# Patient Record
Sex: FEMALE | Race: BLACK OR AFRICAN AMERICAN | NOT HISPANIC OR LATINO | Employment: FULL TIME | URBAN - METROPOLITAN AREA
[De-identification: names, ages, dates, MRNs, and addresses within clinical notes are randomized per-mention and may not be internally consistent; named-entity substitution may affect disease eponyms.]

---

## 2022-07-08 ENCOUNTER — TELEPHONE (OUTPATIENT)
Dept: OBSTETRICS AND GYNECOLOGY | Facility: CLINIC | Age: 22
End: 2022-07-08
Payer: MEDICAID

## 2022-07-08 NOTE — TELEPHONE ENCOUNTER
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 Currently 18 wks 2 days. Has hypertension and current ob states she will need to be induced on 11/23/22 due to hypertension. Pt is wanting to delivery baby here. Pt states she will be coming home on 11/20/22 and per darren mai pt needs to come with all medical records on 11/20/22 to est care so that we she can evaluate pt and scheduled induction. Pt states that her leave has already been approved and she will be taking her full maternity leave in [place] so that pt can stay with her mother. I sent pt a portal link and told pt to give us a call or send a message through the portal around the middle of October so that we can schedule appointment on 11/20/22 for her to see Mrs. Mai. Also informed pt that she could fax records over as her pregnancy goes if she would like. Pt verbalized understanding.

## 2024-05-21 ENCOUNTER — OFFICE VISIT (OUTPATIENT)
Dept: FAMILY MEDICINE | Facility: CLINIC | Age: 24
End: 2024-05-21
Payer: COMMERCIAL

## 2024-05-21 VITALS
BODY MASS INDEX: 31.99 KG/M2 | HEART RATE: 77 BPM | RESPIRATION RATE: 18 BRPM | DIASTOLIC BLOOD PRESSURE: 70 MMHG | HEIGHT: 64 IN | WEIGHT: 187.38 LBS | TEMPERATURE: 98 F | OXYGEN SATURATION: 99 % | SYSTOLIC BLOOD PRESSURE: 104 MMHG

## 2024-05-21 DIAGNOSIS — G89.29 CHRONIC RIGHT SHOULDER PAIN: ICD-10-CM

## 2024-05-21 DIAGNOSIS — Z76.89 ENCOUNTER TO ESTABLISH CARE: Primary | ICD-10-CM

## 2024-05-21 DIAGNOSIS — I10 HYPERTENSION, UNSPECIFIED TYPE: ICD-10-CM

## 2024-05-21 DIAGNOSIS — F43.10 PTSD (POST-TRAUMATIC STRESS DISORDER): ICD-10-CM

## 2024-05-21 DIAGNOSIS — E28.2 PCOS (POLYCYSTIC OVARIAN SYNDROME): ICD-10-CM

## 2024-05-21 DIAGNOSIS — M54.50 LUMBAR BACK PAIN: ICD-10-CM

## 2024-05-21 DIAGNOSIS — I51.9 HEART DISEASE: ICD-10-CM

## 2024-05-21 DIAGNOSIS — M25.511 CHRONIC RIGHT SHOULDER PAIN: ICD-10-CM

## 2024-05-21 PROCEDURE — 99204 OFFICE O/P NEW MOD 45 MIN: CPT | Mod: ,,, | Performed by: NURSE PRACTITIONER

## 2024-05-21 PROCEDURE — 3078F DIAST BP <80 MM HG: CPT | Mod: CPTII,,, | Performed by: NURSE PRACTITIONER

## 2024-05-21 PROCEDURE — 3074F SYST BP LT 130 MM HG: CPT | Mod: CPTII,,, | Performed by: NURSE PRACTITIONER

## 2024-05-21 PROCEDURE — 1159F MED LIST DOCD IN RCRD: CPT | Mod: CPTII,,, | Performed by: NURSE PRACTITIONER

## 2024-05-21 PROCEDURE — 3008F BODY MASS INDEX DOCD: CPT | Mod: CPTII,,, | Performed by: NURSE PRACTITIONER

## 2024-05-21 RX ORDER — CELECOXIB 200 MG/1
200 CAPSULE ORAL DAILY
COMMUNITY
Start: 2024-04-02 | End: 2024-07-01

## 2024-05-21 RX ORDER — LIDOCAINE 50 MG/G
1 PATCH TOPICAL DAILY
COMMUNITY
Start: 2024-01-26

## 2024-05-21 RX ORDER — UBROGEPANT 100 MG/1
100 TABLET ORAL DAILY PRN
COMMUNITY
Start: 2024-04-02

## 2024-05-21 RX ORDER — IBUPROFEN 600 MG/1
600 TABLET ORAL DAILY PRN
COMMUNITY
Start: 2024-03-06

## 2024-05-21 NOTE — PROGRESS NOTES
SUBJECTIVE:     History of Present Illness      Chief Complaint: Establish Care (C/O right shoulder pain, chronic, x 3 years.  C/O bilateral hip pain x 3 years.  Has hypertensive heart disease, blood pressure been running high, having headaches, dizziness and black spots x 2 weeks ( mult meds for HTN).)    HPI:  Patient is a 23 y.o. year old female who presents to clinic f to establish care as a new patient.  Patient reports previous PCP [facility].  She has a history of reported hypertension and heart disease.  Patient states most of her hypertension issues began around her pregnancy approximately 2 years ago.  She is currently taking labetalol, amlodipine, losartan HCTZ and Minipress and spironolactone for uncontrolled hypertension. '  In the past she has followed up with [facility] in the [place] area.     Today's patient's blood pressure 104/70.  Patient reports that she has been having headaches feels like her blood pressure has been elevated.  Has not had any recent eye exam.      Patient also complains of chronic right shoulder and bilateral hip back pain.  Patient reports that she was in the  and has a carry a lot of heavy equipment back in 4.  Denies any injuries.    Review of Systems:    Review of Systems    12 point review of systems conducted, negative except as stated in the history of present illness. See HPI for details.     Previous History    Taj Garcia MD  Review of patient's allergies indicates:  No Known Allergies    Past Medical History:   Diagnosis Date    Genetic testing     MyRisk Negative, Remaining Lifitime Risk 11.6%    Heart disease     Hx of migraine headaches     Hypertension     Insulin resistance     PCOS (polycystic ovarian syndrome)     PTSD (post-traumatic stress disorder)      Current Outpatient Medications   Medication Instructions    amLODIPine (NORVASC) 10 MG tablet     buPROPion (WELLBUTRIN XL) 150 mg, Oral, Every morning    celecoxib (CELEBREX) 200 mg,  "Oral, Daily    ibuprofen (ADVIL,MOTRIN) 600 mg, Oral, Daily PRN    labetaloL (NORMODYNE) 200 MG tablet 1 tablet, Oral    LIDOcaine (LIDODERM) 5 % 1 patch, Transdermal, Daily    losartan-hydrochlorothiazide 50-12.5 mg (HYZAAR) 50-12.5 mg per tablet No dose, route, or frequency recorded.    meloxicam (MOBIC) 15 mg, Oral    metFORMIN (GLUCOPHAGE-XR) 500 mg, Oral, With dinner    norgestimate-ethinyl estradioL (ORTHO-CYCLEN) 0.25-35 mg-mcg per tablet 1 tablet, Oral, Daily    prazosin (MINIPRESS) 1 mg, Oral    spironolactone (ALDACTONE) 100 mg, Oral    tramadol-acetaminophen 37.5-325 mg (ULTRACET) 37.5-325 mg Tab 1 tablet, Oral, Every 6 hours PRN    UBRELVY 100 mg, Oral, Daily PRN     Past Surgical History:   Procedure Laterality Date     SECTION      WISDOM TOOTH EXTRACTION       Family History   Problem Relation Name Age of Onset    Thyroid disease Mother      Breast cancer Maternal Grandmother      Left Breast Cancer Maternal Grandmother      Diabetes Maternal Grandfather         Social History     Tobacco Use    Smoking status: Every Day     Types: Vaping with nicotine    Smokeless tobacco: Never   Substance Use Topics    Alcohol use: Yes     Comment: socially    Drug use: Yes     Types: Marijuana     Comment: prescription marijuana        Health Maintenance      Health Maintenance   Topic Date Due    Hepatitis C Screening  Never done    TETANUS VACCINE  2022    Chlamydia Screening  2025    Pap Smear  2026    Lipid Panel  Completed    HPV Vaccines  Completed       OBJECTIVE:     Physical Exam      Vital Signs Reviewed   Visit Vitals  /70 (BP Location: Left arm, Patient Position: Sitting)   Pulse 77   Temp 98.2 °F (36.8 °C) (Oral)   Resp 18   Ht 5' 4" (1.626 m)   Wt 85 kg (187 lb 6.4 oz)   LMP 2024 (Approximate)   SpO2 99%   BMI 32.17 kg/m²       Physical Exam    Physical Exam:  General: Alert, well nourished, no acute distress, non-toxic appearing.   Eyes: Anicteric sclera, " "without conjunctival injection, normal lids, no purulent drainage, EOMs grossly intact.   Ears: No tragal tenderness. Tympanic membranes intact, pearly grey, without effusion or erythema and with a positive light reflex.   Mouth: Posterior pharynx without erythema. No exudate, ulcerations, or lesion. No tonsillar swelling.   Neck: Supple, full ROM, no rigidity, no cervical adenopathy.   Cardio: Normal rate and rhythm    Resp: Respirations even and unlabored, clear to auscultation bilaterally.   Abd: No ecchymosis or distension. Normal bowel sounds in all 4 quadrants. No tenderness to palpation. No rebound tenderness or guarding. No CVA tenderness.   Skin: No rashes or open lesions noted.   MSK: No swelling. No abrasions or signs of trauma. Ambulating without assistance.   Neuro: Alert,oriented No focal deficits noted. Facial expressions even.   Psych: Cooperative, Normal affect      Procedures    Procedures     Labs     Results for orders placed or performed in visit on 04/18/24   CT/NG, T. vaginalis   Result Value Ref Range    Chlamydia, Amplified DNA Not Detected Not Detected    N gonorrhoeae, amplified DNA Not Detected Not Detected    Trichomonas vaginalis Not Detected Not Detected   Vaginosis Screen by DNA Probe   Result Value Ref Range    Trichomonas vaginalis Not Detected Not Detected    Candida sp Not Detected Not Detected    Linda glabrata DNA Not Detected Not Detected    Bacterial vaginosis DNA Not Detected Not Detected       Chemistry:  No results found for: "NA", "K", "CHLORIDE", "BUN", "CREATININE", "EGFRNORACEVR", "GLUCOSE", "CALCIUM", "ALKPHOS", "LABPROT", "ALBUMIN", "BILIDIR", "IBILI", "AST", "ALT", "MG", "PHOS", "[id number]", "TSH", "[id number]", "PSA"     No results found for: "HGBA1C", "MICROALBCREA"     Hematology:  No results found for: "WBC", "HGB", "HCT", "PLT"    Lipid Panel:  No results found for: "CHOL", "HDL", "LDL", "TRIG", "TOTALCHOLEST"     Urine:  No results found for: "COLORUA", " ""APPEARANCEUA", "SGUA", "PHUA", "PROTEINUA", "GLUCOSEUA", "KETONESUA", "BLOODUA", "NITRITESUA", "LEUKOCYTESUR", "RBCUA", "WBCUA", "BACTERIA", "SQEPUA", "HYALINECASTS", "CREATRANDUR", "PROTEINURINE", "UPROTCREA"      Assessment            ICD-10-CM ICD-9-CM   1. Encounter to establish care  Z76.89 V65.8   2. Hypertension, unspecified type  I10 401.9   3. Heart disease  I51.9 429.9   4. PTSD (post-traumatic stress disorder)  F43.10 309.81   5. PCOS (polycystic ovarian syndrome)  E28.2 256.4   6. Lumbar back pain  M54.50 724.2   7. Chronic right shoulder pain  M25.511 719.41    G89.29 338.29       Plan       1. Hypertension, unspecified type  - Ambulatory referral/consult to Cardiology; Future  Controlled with medication.   Continue current medication as prescribed   Low salt/ DASH diet   Discussed lifestyle modifications.   encourage aerobic excise at least 30 mins a day   Monitor BP daily goal less than 140/90.   Denies headaches, blurred vision, or dizziness    2. Heart disease  - Ambulatory referral/consult to Cardiology; Future    3. PTSD (post-traumatic stress disorder)  Denies SI, HI hallucinations.    Establish good family/social support, reading, meditation, physical activity.  ED precautions discussed    4. PCOS (polycystic ovarian syndrome)    5. Lumbar back pain  - X-Ray Lumbar Spine AP And Lateral; Future    6. Chronic right shoulder pain  - X-ray Shoulder 2 or More Views Right; Future    Orders Placed This Encounter    X-Ray Lumbar Spine AP And Lateral    X-ray Shoulder 2 or More Views Right    CBC Auto Differential    Comprehensive Metabolic Panel    Lipid Panel    TSH    Hemoglobin A1C    Vitamin D    Ambulatory referral/consult to Cardiology    Ambulatory referral/consult to Physical/Occupational Therapy      Medication List with Changes/Refills   Current Medications    AMLODIPINE (NORVASC) 10 MG TABLET        BUPROPION (WELLBUTRIN XL) 150 MG TB24 TABLET    Take 150 mg by mouth every morning.    " CELECOXIB (CELEBREX) 200 MG CAPSULE    Take 200 mg by mouth once daily.    IBUPROFEN (ADVIL,MOTRIN) 600 MG TABLET    Take 600 mg by mouth daily as needed.    LABETALOL (NORMODYNE) 200 MG TABLET    Take 1 tablet by mouth.    LIDOCAINE (LIDODERM) 5 %    Place 1 patch onto the skin once daily.    LOSARTAN-HYDROCHLOROTHIAZIDE 50-12.5 MG (HYZAAR) 50-12.5 MG PER TABLET        MELOXICAM (MOBIC) 15 MG TABLET    Take 15 mg by mouth.    METFORMIN (GLUCOPHAGE-XR) 500 MG ER 24HR TABLET    Take 1 tablet (500 mg total) by mouth daily with dinner or evening meal.    NORGESTIMATE-ETHINYL ESTRADIOL (ORTHO-CYCLEN) 0.25-35 MG-MCG PER TABLET    Take 1 tablet by mouth once daily.    PRAZOSIN (MINIPRESS) 1 MG CAP    Take 1 mg by mouth.    SPIRONOLACTONE (ALDACTONE) 100 MG TABLET    Take 100 mg by mouth.    TRAMADOL-ACETAMINOPHEN 37.5-325 MG (ULTRACET) 37.5-325 MG TAB    Take 1 tablet by mouth every 6 (six) hours as needed.    UBROGEPANT (UBRELVY) 100 MG TABLET    Take 100 mg by mouth daily as needed.       Follow up in about 6 weeks (around 7/2/2024) for Wellness, LABS Prior.   Follow up in about 6 weeks (around 7/2/2024) for Wellness, LABS Prior. In addition to their scheduled follow up, the patient has also been instructed to follow up on as needed basis.   Future Appointments   Date Time Provider Department Center   7/11/2024  8:30 AM Nicholas Rivas FNP [facility]   9/12/2024  1:00 PM Micheal Cartwright MD [facility] [place] [facility]

## 2024-06-04 ENCOUNTER — CLINICAL SUPPORT (OUTPATIENT)
Dept: REHABILITATION | Facility: HOSPITAL | Age: 24
End: 2024-06-04
Payer: COMMERCIAL

## 2024-06-04 DIAGNOSIS — M25.551 BILATERAL HIP PAIN: ICD-10-CM

## 2024-06-04 DIAGNOSIS — M54.50 LUMBAR BACK PAIN: Primary | ICD-10-CM

## 2024-06-04 DIAGNOSIS — M25.552 BILATERAL HIP PAIN: ICD-10-CM

## 2024-06-04 DIAGNOSIS — R29.898 DECREASED STRENGTH OF LOWER EXTREMITY: ICD-10-CM

## 2024-06-04 DIAGNOSIS — R29.898 IMPAIRED FLEXIBILITY OF LOWER EXTREMITY: ICD-10-CM

## 2024-06-04 PROCEDURE — 97162 PT EVAL MOD COMPLEX 30 MIN: CPT

## 2024-06-04 NOTE — PLAN OF CARE
"[facility]   Physical Therapy Initial Evaluation      Name: Karol [facility] Number: [id number]    Therapy Diagnosis:   Encounter Diagnoses   Name Primary?    Lumbar back pain Yes    Bilateral hip pain     Impaired flexibility of lower extremity     Decreased strength of lower extremity         Physician: Nicholas Rivas FNP    Physician Orders: PT Eval and Treat, 2 wk 6  Medical Diagnosis from Referral: M54.5- Lumbar Back Pain  Evaluation Date: 6/4/2024  Authorization Period Expiration: TBD  Plan of Care Expiration: TBD  Reassessment / Plan of Care Due: 7/2/24  Visit # / Visits authorized: 0/      Functional hip FOTO score: 40 (6/4/24)    Precautions: Standard     Time In: 0800  Time Out: 0855  Total Appointment Time (timed & untimed codes): 55 minutes    Subjective     Date of onset / History of current condition - Karol reports: she has been having B hip pain for a few years now with no specific injury however she was in the  and performed lots of hard / heavy training, lifting, carrying things on her back, running which may have caused her problems. Pain is increased with prolonged standing, lifting, lateral hip movements, trying to work-out. No pain in sitting or laying on her sides. She takes Meloxicam which provides no relief    Falls: none    Imaging: x-ray ordered of lumbar spine however not performed yet.     Prior Therapy: none  Social History:  lives with a young child  Occupation: student  Prior Level of Function: Independent  Current Level of Function: Independent    Pain:  Current 6/10, worst 9/10, best 6/10   Location: B hips, midline low back  Description: Aching, Tingling, Deep, Numb, and constant  Aggravating Factors: prolonged standing, lifting, bending, carrying things  Easing Factors: sitting, laying down    Patients goals: "decrease B hip pain"     Medical History:   Past Medical History:   Diagnosis Date    Genetic testing     MyRisk Negative, " Remaining Lifitime Risk 11.6%    Heart disease     Hx of migraine headaches     Hypertension     Insulin resistance     PCOS (polycystic ovarian syndrome)     PTSD (post-traumatic stress disorder)        Surgical History:   Karol Flanagan  has a past surgical history that includes  section and Viola tooth extraction.    Medications:   Karol has a current medication list which includes the following prescription(s): amlodipine, bupropion, celecoxib, ibuprofen, labetalol, lidocaine, losartan-hydrochlorothiazide 50-12.5 mg, meloxicam, metformin, norgestimate-ethinyl estradiol, prazosin, spironolactone, tramadol-acetaminophen 37.5-325 mg, and ubrelvy, and the following Facility-Administered Medications: etonogestrel.    Allergies:   Review of patient's allergies indicates:  No Known Allergies     Objective      B hip strength: 4/5 MMT throughout with c/o increased groin pain with resisted hip flexion  Lumbar ROM: WNL throughout without c/o pain  Muscle tightness: B hamstrings, quadriceps, piriformis R > L  Tenderness: no tenderness with PA glides throughout lumbar spine or B SIJ    (+) KATHY and FADIR in R hip for pain but no crepitus    No relief of symptoms with R LE distraction in flexion, abduction, and ER position    Treatment     Total Treatment time (time-based codes) separate from Evaluation: 5 minutes     Karol received the treatments listed below:      therapeutic exercises to develop strength, flexibility, and core stabilization for 5 minutes including:    Therapeutic Exercise   Therapeutic Exercise Grid     Exercise 1  Exercise 2  Exercise 3  Exercise 4    Exercise :    B LE Stretches:   Hamstrings,  Piriformis,  Quadriceps,  IT band B LE: 4 way hip Bridges with abd resistance with TB Prone reverse clams   Repetition/Time :                  Resist or Assist :                  Comment :                Done :                                Exercise 5  Exercise 6  Exercise 7  Exercise 8    Exercise :     Sit to stands with weight   Dead lifts Abdominal press with SB with head lift   Leg press   Repetition/Time :                  Resist or Assist :                  Comment :                  Done :                                  Exercise 9  Exercise 10  Exercise 11  Exercise 12    Exercise :    squats   Waddle with TB         Repetition/Time :                  Resist or Assist :                  Comment :                  Done :                                 Exercise 13 Exercise 14  Exercise 15  Exercise 16   Exercise :                  Repetition/Time :                  Resist or Assist :                  Comment :                  Done :                                  Patient Education and Home Exercises     Education provided:   - importance and benefits of performing HEP daily for optimal improvements  -importance of strengthening core muscles and decreased muscle tightness to reduce stress on lumbar spine with daily activities    Written Home Exercises Provided: Patient instructed to cont prior HEP. Exercises were reviewed and Karol was able to demonstrate them prior to the end of the session.  Karol demonstrated good understanding of the education provided. See EMR under Patient Instructions for exercises provided during therapy sessions.    Assessment     Karol is a 23 y.o. female referred to outpatient Physical Therapy with a medical diagnosis of lumbar back pain. Patient presents with pain in B hips, muscle tightness, and core and hip muscle weakness affecting tolerance with daily activities. Pt would benefit from PT services to address pt's deficits    Patient prognosis is Good.   Patient will benefit from skilled outpatient Physical Therapy to address the deficits stated above and in the chart below, provide patient /family education, and to maximize patientt's level of independence.     Plan of care discussed with patient: YES  Patient's spiritual, cultural and educational needs considered and  patient is agreeable to the plan of care and goals as stated below:     Anticipated Barriers for therapy: chronic LBP and B hip pain    Medical Necessity is demonstrated by the following  History  Co-morbidities and personal factors that may impact the plan of care [] LOW: no personal factors / co-morbidities  [x] MODERATE: 1-2 personal factors / co-morbidities  [] HIGH: 3+ personal factors / co-morbidities    Moderate / High Support Documentation:     Co-morbidities affecting plan of care: see above     Examination  Body Structures and Functions, activity limitations and participation restrictions that may impact the plan of care [] LOW: addressing 1-2 elements  [x] MODERATE: 3+ elements  [] HIGH: 4+ elements (please support below)    Moderate / High Support Documentation: see above     Clinical Presentation [] LOW: stable  [x] MODERATE: Evolving  [] HIGH: Unstable     Decision Making/ Complexity Score: moderate       Goals:    Short Term Goals: 4 weeks     Pt will demonstrate knowledge and independence with HEP to continue with exercises outside of therapy to facilitate optimal overall improvements    Pt will improve functional score on hip FOTO by >10 points which indicates improved ability to perform daily tasks with less difficulty and pain    Reduce c/o pain in B hips to <4/10 to improve tolerance with daily activities    Improve strength in B hips to 4+/5 MMT throughout in order to improve tolerance with overall functional mobility    Long Term Goals: 6 weeks     Pt will improve functional score on hip FOTO by >20 points which indicates improved ability to perform daily tasks with less difficulty and pain    Reduce c/o pain in B hips to <2/10 to improve tolerance with daily activities    Improve strength in B hips to 5/5 MMT throughout in order to improve tolerance with overall functional mobility    Improve flexibility of B LE in order to reduce mechanical stressors on the lumbar spine    Pt will demonstrate  proper bending and lifting techniques to reduce stress on lumbar spine    Improve core strength as evidence by able to carry / lift heavy objects with <2/10 pain level    Plan     Plan of care Certification: TBD.    Outpatient Physical Therapy 2 times weekly for 6 weeks to include the following interventions: Manual Therapy, Patient Education, Therapeutic Exercise, and pain management .     Nadia Coyle, PT       I CERTIFY THE NEED FOR THESE SERVICES FURNISHED UNDER THIS PLAN OF TREATMENT AND WHILE UNDER MY CARE   Physician's comments:      Physician's Signature: ___________________________________________________

## 2024-06-11 DIAGNOSIS — M54.50 LUMBAR BACK PAIN: Primary | ICD-10-CM

## 2024-06-19 ENCOUNTER — CLINICAL SUPPORT (OUTPATIENT)
Dept: REHABILITATION | Facility: HOSPITAL | Age: 24
End: 2024-06-19
Payer: COMMERCIAL

## 2024-06-19 DIAGNOSIS — M25.551 BILATERAL HIP PAIN: ICD-10-CM

## 2024-06-19 DIAGNOSIS — R29.898 IMPAIRED FLEXIBILITY OF LOWER EXTREMITY: ICD-10-CM

## 2024-06-19 DIAGNOSIS — R29.898 DECREASED STRENGTH OF LOWER EXTREMITY: ICD-10-CM

## 2024-06-19 DIAGNOSIS — M54.50 LUMBAR BACK PAIN: Primary | ICD-10-CM

## 2024-06-19 DIAGNOSIS — M25.552 BILATERAL HIP PAIN: ICD-10-CM

## 2024-06-19 PROCEDURE — 97110 THERAPEUTIC EXERCISES: CPT

## 2024-06-19 NOTE — PROGRESS NOTES
[facility]   Physical Therapy Treatment Note      Name: Karol Wilbur  Clinic Number: [id number]    Therapy Diagnosis:   Encounter Diagnoses   Name Primary?    Lumbar back pain Yes    Bilateral hip pain     Impaired flexibility of lower extremity     Decreased strength of lower extremity      Physician: Nicholas Rivas FNP    Visit Date: 6/19/2024    Medical Diagnosis from Referral: M54.5- Lumbar Back Pain  Evaluation Date: 6/4/2024  Authorization Period Expiration: 11/30/2024  Plan of Care Expiration: 11/30  Reassessment / Plan of Care Due: 7/2/24  Visit # / Visits authorized: 1/ 12      PTA Visit #: 0/5     Time In: 1025  Time Out: 1100  Total Billable Time: 35 minutes    Subjective     Pt reports: she had left hip pain with end range flexion. She reports performed HEP issued on eval  She was compliant with home exercise program.  Response to previous treatment: n/a  Functional change: n/a    Functional hip FOTO score: 40 (6/4/24)    Pain: 4/10 with Meloxicam  Location: B hips     Objective      N/a.     Treatment     Karol received the treatments listed below:      therapeutic exercises to develop strength, flexibility, and core stabilization for 35 minutes including:     Therapeutic Exercise   Therapeutic Exercise Grid     Exercise 1  Exercise 2  Exercise 3  Exercise 4    Exercise :    B LE Stretches:   Hamstrings,  Piriformis,  Quadriceps,  IT band B LE: 4 way hip Bridges with abd resistance with TB Prone reverse clams   Repetition/Time :    3 x 30 sec   15   10 x 5 sec   10     Resist or Assist :          Green TB   Green TB     Comment :           pain across low back      Done :    yes  yes yes   yes                      Exercise 5  Exercise 6  Exercise 7  Exercise 8    Exercise :    Sit to stands with weight   Dead lifts Abdominal press with SB with head lift   Leg press   Repetition/Time :    10   10   10 x 5 sec        Resist or Assist :    5 lbs   10 lbs           Comment :                   Done :    yes   yes yes                         Exercise 9  Exercise 10  Exercise 11  Exercise 12    Exercise :    squats   Waddle with TB         Repetition/Time :                  Resist or Assist :                  Comment :                  Done :                                   Exercise 13 Exercise 14  Exercise 15  Exercise 16   Exercise :                  Repetition/Time :                  Resist or Assist :                  Comment :                  Done :                                   Patient Education and Home Exercises       Education provided:   - importance and benefits of performing HEP daily for optimal improvements  - importance of strengthening core muscles and decreased muscle tightness to reduce stress on lumbar spine with daily activities    Written Home Exercises Provided: Patient instructed to cont prior HEP. Exercises were reviewed and Karol was able to demonstrate them prior to the end of the session.  Karol demonstrated good understanding of the education provided. See EMR under Patient Instructions for exercises provided during therapy sessions    Assessment     Pt tolerated initiation of stretches and core and LE strengthening exercises well with cues required for proper technique to isolate specific muscles. Pt had pain across her lumbar spine with bridges exercise. Discussed expected soreness when starting exercise program    Karol Is progressing well towards her goals.   Pt prognosis is Good.     Pt will continue to benefit from skilled outpatient physical therapy to address the deficits listed in the problem list box on initial evaluation, provide pt/family education and to maximize pt's level of independence in the home and community environment.     Pt's spiritual, cultural and educational needs considered and pt agreeable to plan of care and goals.     Anticipated barriers to physical therapy: chronic LBP and B hip pain     Goals:   Short Term Goals: 4 weeks       Pt will demonstrate knowledge and independence with HEP to continue with exercises outside of therapy to facilitate optimal overall improvements     Pt will improve functional score on hip FOTO by >10 points which indicates improved ability to perform daily tasks with less difficulty and pain     Reduce c/o pain in B hips to <4/10 to improve tolerance with daily activities     Improve strength in B hips to 4+/5 MMT throughout in order to improve tolerance with overall functional mobility     Long Term Goals: 6 weeks      Pt will improve functional score on hip FOTO by >20 points which indicates improved ability to perform daily tasks with less difficulty and pain     Reduce c/o pain in B hips to <2/10 to improve tolerance with daily activities     Improve strength in B hips to 5/5 MMT throughout in order to improve tolerance with overall functional mobility     Improve flexibility of B LE in order to reduce mechanical stressors on the lumbar spine     Pt will demonstrate proper bending and lifting techniques to reduce stress on lumbar spine     Improve core strength as evidence by able to carry / lift heavy objects with <2/10 pain level    Plan     Continue with current POC and progress per pt's tolerance    Nadia Coyle, PT

## 2024-06-24 ENCOUNTER — CLINICAL SUPPORT (OUTPATIENT)
Dept: REHABILITATION | Facility: HOSPITAL | Age: 24
End: 2024-06-24
Payer: COMMERCIAL

## 2024-06-24 DIAGNOSIS — M25.552 BILATERAL HIP PAIN: ICD-10-CM

## 2024-06-24 DIAGNOSIS — M54.50 LUMBAR BACK PAIN: Primary | ICD-10-CM

## 2024-06-24 DIAGNOSIS — M25.551 BILATERAL HIP PAIN: ICD-10-CM

## 2024-06-24 DIAGNOSIS — R29.898 IMPAIRED FLEXIBILITY OF LOWER EXTREMITY: ICD-10-CM

## 2024-06-24 DIAGNOSIS — R29.898 DECREASED STRENGTH OF LOWER EXTREMITY: ICD-10-CM

## 2024-06-24 NOTE — PROGRESS NOTES
[facility]   Physical Therapy Treatment Note      Name: Karol Malinais  Clinic Number: [id number]    Therapy Diagnosis:   Encounter Diagnoses   Name Primary?    Lumbar back pain Yes    Bilateral hip pain     Impaired flexibility of lower extremity     Decreased strength of lower extremity      Physician: Nicholas Rivas FNP    Visit Date: 6/24/2024    Medical Diagnosis from Referral: M54.5- Lumbar Back Pain  Evaluation Date: 6/4/2024  Authorization Period Expiration: 11/30/2024  Plan of Care Expiration: 11/30/2024  Reassessment / Plan of Care Due: 7/2/24  Visit # / Visits authorized: 2/ 12      PTA Visit #: 0/5     Time In: 1100  Time Out: 1145  Total Billable Time: 45 minutes    Subjective     Pt reports: her B hip pain is the same even with Meloxicam medication. Increased soreness after cutting her grass yesterday. Reports numbness in B LE during hamstring stretch  She was compliant with home exercise program.  Response to previous treatment: fair  Functional change: none thus far    Functional hip FOTO score: 40 (6/4/24)    Pain: 4/10 with Meloxicam  Location: B hips     Objective      N/a.     Treatment     Karol received the treatments listed below:      therapeutic exercises to develop strength, flexibility, and core stabilization for 45 minutes including:     Therapeutic Exercise   Therapeutic Exercise Grid     Exercise 1  Exercise 2  Exercise 3  Exercise 4    Exercise :    B LE Stretches:   Hamstrings,  Piriformis,  Quadriceps,  IT band B LE: 4 way hip Bridges with abd resistance with TB Prone reverse clams   Repetition/Time :    3 x 30 sec   15   10 x 5 sec   10     Resist or Assist :          Green TB   Green TB     Comment :           pain across low back      Done :    yes  yes yes   yes                      Exercise 5  Exercise 6  Exercise 7  Exercise 8    Exercise :    Sit to stands with weight   Dead lifts Abdominal press with SB with head lift   Leg press    Repetition/Time :    10   10   10 x 5 sec        Resist or Assist :    5 lbs   10 lbs           Comment :                  Done :    yes   yes yes                         Exercise 9  Exercise 10  Exercise 11  Exercise 12    Exercise :    squats   Waddle with TB B LE distraction   Manual lumbar traction     Repetition/Time :    10   5 x 15 sec   5 x 10 sec   5 x 10 secs     Resist or Assist :       Green TB   Flexion, abduction, ER   Yellow SB     Comment :          No relief   Minimal relief   Done :    yes   yes   yes   yes                    Exercise 13 Exercise 14  Exercise 15  Exercise 16   Exercise :                  Repetition/Time :                  Resist or Assist :                  Comment :                  Done :                                 Patient Education and Home Exercises       Education provided:   - importance and benefits of performing HEP daily for optimal improvements  - importance of strengthening core muscles and decreased muscle tightness to reduce stress on lumbar spine with daily activities    Written Home Exercises Provided: Patient instructed to cont prior HEP. Exercises were reviewed and Karol was able to demonstrate them prior to the end of the session.  Karol demonstrated good understanding of the education provided. See EMR under Patient Instructions for exercises provided during therapy sessions    Assessment     Pt continues to have same amount of pain in B hips with intermittent numbness in B LE, R > L, with certain movements. No relief of symptoms with manual B LE distraction and minimal relief with manual lumbar traction. Pt performed all core and LE exercises and stretches with good effort with reps and resistance not increased per pt's request with cues required for proper technique to isolate specific muscles    Karol Is progressing well towards her goals.   Pt prognosis is Good.     Pt will continue to benefit from skilled outpatient physical therapy to address the  deficits listed in the problem list box on initial evaluation, provide pt/family education and to maximize pt's level of independence in the home and community environment.     Pt's spiritual, cultural and educational needs considered and pt agreeable to plan of care and goals.     Anticipated barriers to physical therapy: chronic LBP and B hip pain     Goals:     Short Term Goals: 4 weeks      Pt will demonstrate knowledge and independence with HEP to continue with exercises outside of therapy to facilitate optimal overall improvements     Pt will improve functional score on hip FOTO by >10 points which indicates improved ability to perform daily tasks with less difficulty and pain     Reduce c/o pain in B hips to <4/10 to improve tolerance with daily activities     Improve strength in B hips to 4+/5 MMT throughout in order to improve tolerance with overall functional mobility     Long Term Goals: 6 weeks      Pt will improve functional score on hip FOTO by >20 points which indicates improved ability to perform daily tasks with less difficulty and pain     Reduce c/o pain in B hips to <2/10 to improve tolerance with daily activities     Improve strength in B hips to 5/5 MMT throughout in order to improve tolerance with overall functional mobility     Improve flexibility of B LE in order to reduce mechanical stressors on the lumbar spine     Pt will demonstrate proper bending and lifting techniques to reduce stress on lumbar spine     Improve core strength as evidence by able to carry / lift heavy objects with <2/10 pain level    Plan     Continue with current POC and progress per pt's tolerance    Nadia Coyle PT

## 2024-06-25 ENCOUNTER — CLINICAL SUPPORT (OUTPATIENT)
Dept: REHABILITATION | Facility: HOSPITAL | Age: 24
End: 2024-06-25
Payer: COMMERCIAL

## 2024-06-25 DIAGNOSIS — M25.552 BILATERAL HIP PAIN: ICD-10-CM

## 2024-06-25 DIAGNOSIS — M25.551 BILATERAL HIP PAIN: ICD-10-CM

## 2024-06-25 DIAGNOSIS — M54.50 LUMBAR BACK PAIN: Primary | ICD-10-CM

## 2024-06-25 DIAGNOSIS — R29.898 DECREASED STRENGTH OF LOWER EXTREMITY: ICD-10-CM

## 2024-06-25 DIAGNOSIS — R29.898 IMPAIRED FLEXIBILITY OF LOWER EXTREMITY: ICD-10-CM

## 2024-06-25 PROCEDURE — 97110 THERAPEUTIC EXERCISES: CPT | Mod: CQ

## 2024-06-25 NOTE — PROGRESS NOTES
[facility]   Physical Therapy Treatment Note      Name: Karol Harwood  Clinic Number: [id number]    Therapy Diagnosis:        Encounter Diagnoses   Name Primary?    Lumbar back pain Yes    Bilateral hip pain      Impaired flexibility of lower extremity      Decreased strength of lower extremity        Physician: Nicholas Rivas FNP    Visit Date: 6/25/2024    Medical Diagnosis from Referral: M54.5- Lumbar Back Pain  Evaluation Date: 6/4/2024  Authorization Period Expiration: 11/30/2024  Plan of Care Expiration: 11/30/2024  Reassessment / Plan of Care Due: 7/2/24  Visit # / Visits authorized: 3/ 12      PTA Visit #: 1/5     Time In: 1107  Time Out: 1145  Total Billable Time: 38 minutes    Subjective     Pt reports: her pain starts in the lower back and goes around to both hips.     She was compliant with home exercise program.  Response to previous treatment: same  Functional change: none thus far    Functional hip FOTO score: 40 (6/4/24)    Pain: 4/10 with Meloxicam  Location: B hips     Objective      N/a.     Treatment     Karol received the treatments listed below:      therapeutic exercises to develop strength, flexibility, and core stabilization for 38 minutes including:     Therapeutic Exercise   Therapeutic Exercise Grid     Exercise 1  Exercise 2  Exercise 3  Exercise 4    Exercise :    B LE Stretches:   Hamstrings,  Piriformis,  Quadriceps,  IT band B LE: 4 way hip Bridges with abd resistance with TB Prone reverse clams   Repetition/Time :    3 x 30 sec   2x10   10 x 5 sec   15     Resist or Assist :          Green TB   Green TB     Comment :                Done :    yes  yes yes   yes                      Exercise 5  Exercise 6  Exercise 7  Exercise 8    Exercise :    Sit to stands with weight   Dead lifts Abdominal press with SB with head lift   Leg press   Repetition/Time :    10   10   10 x 5 sec        Resist or Assist :    5 lbs   10 lbs           Comment :                   Done :    yes                           Exercise 9  Exercise 10  Exercise 11  Exercise 12    Exercise :    squats   Waddle with TB B LE distraction   Manual lumbar traction     Repetition/Time :    10   5 x 15 sec   5 x 10 sec   10 x 10 secs     Resist or Assist :       Green TB   Flexion, abduction, ER   white SB     Comment :          No relief   Minimal relief   Done :     yes    yes                    Exercise 13 Exercise 14  Exercise 15  Exercise 16   Exercise :                  Repetition/Time :                  Resist or Assist :                  Comment :                  Done :                                 Patient Education and Home Exercises       Education provided:   - importance and benefits of performing HEP daily for optimal improvements  - importance of strengthening core muscles and decreased muscle tightness to reduce stress on lumbar spine with daily activities    Written Home Exercises Provided: Patient instructed to cont prior HEP. Exercises were reviewed and Karol was able to demonstrate them prior to the end of the session.  Karol demonstrated good understanding of the education provided. See EMR under Patient Instructions for exercises provided during therapy sessions    Assessment     Karol demonstrated fair response after today's session as evidenced by report of mild pain in lower back and hips during mobility and thoracolumbar ROM. She is making an expected progression toward goals as evidenced by ability to perform exercises with increased repetitions. However she continues to demonstrate deficits with pain during activities, bilateral hip and lumbar weakness, core stability and strength. Recommend HEP/POC to address deficits mentioned above.      Karol Is progressing well towards her goals.   Pt prognosis is Good.     Pt will continue to benefit from skilled outpatient physical therapy to address the deficits listed in the problem list box on initial evaluation, provide  pt/family education and to maximize pt's level of independence in the home and community environment.     Pt's spiritual, cultural and educational needs considered and pt agreeable to plan of care and goals.     Anticipated barriers to physical therapy: chronic LBP and B hip pain     Goals:     Short Term Goals: 4 weeks      Pt will demonstrate knowledge and independence with HEP to continue with exercises outside of therapy to facilitate optimal overall improvements     Pt will improve functional score on hip FOTO by >10 points which indicates improved ability to perform daily tasks with less difficulty and pain     Reduce c/o pain in B hips to <4/10 to improve tolerance with daily activities     Improve strength in B hips to 4+/5 MMT throughout in order to improve tolerance with overall functional mobility     Long Term Goals: 6 weeks      Pt will improve functional score on hip FOTO by >20 points which indicates improved ability to perform daily tasks with less difficulty and pain     Reduce c/o pain in B hips to <2/10 to improve tolerance with daily activities     Improve strength in B hips to 5/5 MMT throughout in order to improve tolerance with overall functional mobility     Improve flexibility of B LE in order to reduce mechanical stressors on the lumbar spine     Pt will demonstrate proper bending and lifting techniques to reduce stress on lumbar spine     Improve core strength as evidence by able to carry / lift heavy objects with <2/10 pain level    Plan     Continue with current POC and progress per pt's tolerance    Rosemary Guido PTA

## 2024-07-01 ENCOUNTER — DOCUMENTATION ONLY (OUTPATIENT)
Dept: REHABILITATION | Facility: HOSPITAL | Age: 24
End: 2024-07-01

## 2024-07-01 ENCOUNTER — CLINICAL SUPPORT (OUTPATIENT)
Dept: REHABILITATION | Facility: HOSPITAL | Age: 24
End: 2024-07-01
Payer: COMMERCIAL

## 2024-07-01 DIAGNOSIS — R29.898 DECREASED STRENGTH OF LOWER EXTREMITY: ICD-10-CM

## 2024-07-01 DIAGNOSIS — M25.552 BILATERAL HIP PAIN: ICD-10-CM

## 2024-07-01 DIAGNOSIS — R29.898 IMPAIRED FLEXIBILITY OF LOWER EXTREMITY: ICD-10-CM

## 2024-07-01 DIAGNOSIS — M25.551 BILATERAL HIP PAIN: ICD-10-CM

## 2024-07-01 DIAGNOSIS — M54.50 LUMBAR BACK PAIN: Primary | ICD-10-CM

## 2024-07-01 PROCEDURE — 97110 THERAPEUTIC EXERCISES: CPT | Mod: CQ

## 2024-07-01 NOTE — PROGRESS NOTES
[facility] OUTPATIENT THERAPY AND WELLNESS   Physical Therapy Treatment Note      Name: Karol Stowe  Clinic Number: [id number]    Therapy Diagnosis:        Encounter Diagnoses   Name Primary?    Lumbar back pain Yes    Bilateral hip pain      Impaired flexibility of lower extremity      Decreased strength of lower extremity        Physician: Nicholas Rivas FNP    Visit Date: 7/1/2024    Medical Diagnosis from Referral: M54.5- Lumbar Back Pain  Evaluation Date: 6/4/2024  Authorization Period Expiration: 11/30/2024  Plan of Care Expiration: 11/30/2024  Reassessment / Plan of Care Due: 7/2/24  Visit # / Visits authorized: 4/ 12      PTA Visit #: 2/5     Time In: 0848  Time Out: 0935  Total Billable Time: 47 minutes    Subjective     Pt reports:  her pain has gotten worse. Today her hips and lower back is hurting more despite her doing the exercises and stretches at home.     She was compliant with home exercise program.  Response to previous treatment: same  Functional change: none thus far    Functional hip FOTO score: 58 (7/1/24) improved 40 (6/4/24)    Pain: 6/10 with Meloxicam  Location: B hips     Objective      N/a.     Treatment     Kaorl received the treatments listed below:      therapeutic exercises to develop strength, flexibility, and core stabilization for 47 minutes including:     Therapeutic Exercise   Therapeutic Exercise Grid     Exercise 1  Exercise 2  Exercise 3  Exercise 4    Exercise :    B LE Stretches:   Hamstrings,  Piriformis,  Quadriceps,  IT band B LE: 4 way hip Bridges with abd resistance with TB Prone reverse clams   Repetition/Time :    3 x 30 sec   2x10   15 x 5 sec   2x10     Resist or Assist :          Green TB   Green TB     Comment :                Done :    yes  yes yes   yes                      Exercise 5  Exercise 6  Exercise 7  Exercise 8    Exercise :    Sit to stands with weight   Dead lifts Abdominal press with SB with head lift   Leg press   Repetition/Time :    15   10   10  x 5 sec        Resist or Assist :    5 lbs   10 lbs           Comment :                  Done :    yes    yes                       Exercise 9  Exercise 10  Exercise 11  Exercise 12    Exercise :    squats   Waddle with TB B LE distraction   Manual lumbar traction     Repetition/Time :    10   5 x 15 sec   5 x 10 sec   2 minutes   Resist or Assist :       Green TB   Flexion, abduction, ER   white SB     Comment :          Attempted; no relief Minimal relief   Done :      yes yes                    Exercise 13 Exercise 14  Exercise 15  Exercise 16   Exercise :                  Repetition/Time :                  Resist or Assist :                  Comment :                  Done :                                 Patient Education and Home Exercises       Education provided:   - importance and benefits of performing HEP daily for optimal improvements  - importance of strengthening core muscles and decreased muscle tightness to reduce stress on lumbar spine with daily activities    Written Home Exercises Provided: Patient instructed to cont prior HEP. Exercises were reviewed and Karol was able to demonstrate them prior to the end of the session.  Karol demonstrated good understanding of the education provided. See EMR under Patient Instructions for exercises provided during therapy sessions    Assessment     Karol demonstrated (-) response after today's session as evidenced by no change in pain after session today and also reporting she did not feel any relief with BLE distraction and lumbar traction.   She is making a fair progression toward goals as evidenced by increased functional hip FOTO score (see above) and ability to increase in repetitions despite report of pain. However she continues to demonstrate deficits with pain during activities, bilateral hip and lumbar weakness, core stability and strength. Recommend HEP/POC to address deficits mentioned above. Will look to include mechanical traction for the next  terri Roberson Is progressing well towards her goals.   Pt prognosis is Good.     Pt will continue to benefit from skilled outpatient physical therapy to address the deficits listed in the problem list box on initial evaluation, provide pt/family education and to maximize pt's level of independence in the home and community environment.     Pt's spiritual, cultural and educational needs considered and pt agreeable to plan of care and goals.     Anticipated barriers to physical therapy: chronic LBP and B hip pain     Goals:     Short Term Goals: 4 weeks      Pt will demonstrate knowledge and independence with HEP to continue with exercises outside of therapy to facilitate optimal overall improvements     Pt will improve functional score on hip FOTO by >10 points which indicates improved ability to perform daily tasks with less difficulty and pain     Reduce c/o pain in B hips to <4/10 to improve tolerance with daily activities     Improve strength in B hips to 4+/5 MMT throughout in order to improve tolerance with overall functional mobility     Long Term Goals: 6 weeks      Pt will improve functional score on hip FOTO by >20 points which indicates improved ability to perform daily tasks with less difficulty and pain     Reduce c/o pain in B hips to <2/10 to improve tolerance with daily activities     Improve strength in B hips to 5/5 MMT throughout in order to improve tolerance with overall functional mobility     Improve flexibility of B LE in order to reduce mechanical stressors on the lumbar spine     Pt will demonstrate proper bending and lifting techniques to reduce stress on lumbar spine     Improve core strength as evidence by able to carry / lift heavy objects with <2/10 pain level    Plan     Continue with current POC and progress per pt's tolerance    Rosemary Guido PTA

## 2024-07-08 ENCOUNTER — LAB VISIT (OUTPATIENT)
Dept: LAB | Facility: HOSPITAL | Age: 24
End: 2024-07-08
Attending: NURSE PRACTITIONER
Payer: COMMERCIAL

## 2024-07-08 ENCOUNTER — TELEPHONE (OUTPATIENT)
Dept: FAMILY MEDICINE | Facility: CLINIC | Age: 24
End: 2024-07-08
Payer: COMMERCIAL

## 2024-07-08 ENCOUNTER — CLINICAL SUPPORT (OUTPATIENT)
Dept: REHABILITATION | Facility: HOSPITAL | Age: 24
End: 2024-07-08
Payer: COMMERCIAL

## 2024-07-08 DIAGNOSIS — R29.898 DECREASED STRENGTH OF LOWER EXTREMITY: ICD-10-CM

## 2024-07-08 DIAGNOSIS — R29.898 IMPAIRED FLEXIBILITY OF LOWER EXTREMITY: ICD-10-CM

## 2024-07-08 DIAGNOSIS — M54.50 LUMBAR BACK PAIN: Primary | ICD-10-CM

## 2024-07-08 DIAGNOSIS — M25.551 BILATERAL HIP PAIN: ICD-10-CM

## 2024-07-08 DIAGNOSIS — Z11.8 ENCOUNTER FOR SCREENING EXAMINATION FOR CHLAMYDIAL INFECTION: Primary | ICD-10-CM

## 2024-07-08 DIAGNOSIS — Z76.89 ENCOUNTER TO ESTABLISH CARE: ICD-10-CM

## 2024-07-08 DIAGNOSIS — M25.552 BILATERAL HIP PAIN: ICD-10-CM

## 2024-07-08 LAB
25(OH)D3+25(OH)D2 SERPL-MCNC: 19 NG/ML (ref 30–80)
ALBUMIN SERPL-MCNC: 3.8 G/DL (ref 3.5–5)
ALBUMIN/GLOB SERPL: 0.9 RATIO (ref 1.1–2)
ALP SERPL-CCNC: 88 UNIT/L (ref 40–150)
ALT SERPL-CCNC: 16 UNIT/L (ref 0–55)
ANION GAP SERPL CALC-SCNC: 6 MEQ/L
AST SERPL-CCNC: 17 UNIT/L (ref 5–34)
BASOPHILS # BLD AUTO: 0.02 X10(3)/MCL
BASOPHILS NFR BLD AUTO: 0.2 %
BILIRUB SERPL-MCNC: 0.4 MG/DL
BUN SERPL-MCNC: 11.2 MG/DL (ref 7–18.7)
CALCIUM SERPL-MCNC: 9.8 MG/DL (ref 8.4–10.2)
CHLORIDE SERPL-SCNC: 106 MMOL/L (ref 98–107)
CHOLEST SERPL-MCNC: 254 MG/DL
CHOLEST/HDLC SERPL: 5 {RATIO} (ref 0–5)
CO2 SERPL-SCNC: 26 MMOL/L (ref 22–29)
CREAT SERPL-MCNC: 0.78 MG/DL (ref 0.55–1.02)
CREAT/UREA NIT SERPL: 14
EOSINOPHIL # BLD AUTO: 0.12 X10(3)/MCL (ref 0–0.9)
EOSINOPHIL NFR BLD AUTO: 1.4 %
ERYTHROCYTE [DISTWIDTH] IN BLOOD BY AUTOMATED COUNT: 13.2 % (ref 11.5–17)
EST. AVERAGE GLUCOSE BLD GHB EST-MCNC: 102.5 MG/DL
GFR SERPLBLD CREATININE-BSD FMLA CKD-EPI: >60 ML/MIN/1.73/M2
GLOBULIN SER-MCNC: 4.3 GM/DL (ref 2.4–3.5)
GLUCOSE SERPL-MCNC: 102 MG/DL (ref 74–100)
HBA1C MFR BLD: 5.2 %
HCT VFR BLD AUTO: 40.7 % (ref 37–47)
HDLC SERPL-MCNC: 50 MG/DL (ref 35–60)
HGB BLD-MCNC: 13.3 G/DL (ref 12–16)
IMM GRANULOCYTES # BLD AUTO: 0.04 X10(3)/MCL (ref 0–0.04)
IMM GRANULOCYTES NFR BLD AUTO: 0.5 %
LDLC SERPL CALC-MCNC: 178 MG/DL (ref 50–140)
LYMPHOCYTES # BLD AUTO: 2.69 X10(3)/MCL (ref 0.6–4.6)
LYMPHOCYTES NFR BLD AUTO: 31.2 %
MCH RBC QN AUTO: 27.5 PG (ref 27–31)
MCHC RBC AUTO-ENTMCNC: 32.7 G/DL (ref 33–36)
MCV RBC AUTO: 84.1 FL (ref 80–94)
MONOCYTES # BLD AUTO: 0.5 X10(3)/MCL (ref 0.1–1.3)
MONOCYTES NFR BLD AUTO: 5.8 %
NEUTROPHILS # BLD AUTO: 5.24 X10(3)/MCL (ref 2.1–9.2)
NEUTROPHILS NFR BLD AUTO: 60.9 %
PLATELET # BLD AUTO: 232 X10(3)/MCL (ref 130–400)
PMV BLD AUTO: 10.9 FL (ref 7.4–10.4)
POTASSIUM SERPL-SCNC: 4.2 MMOL/L (ref 3.5–5.1)
PROT SERPL-MCNC: 8.1 GM/DL (ref 6.4–8.3)
RBC # BLD AUTO: 4.84 X10(6)/MCL (ref 4.2–5.4)
SODIUM SERPL-SCNC: 138 MMOL/L (ref 136–145)
TRIGL SERPL-MCNC: 132 MG/DL (ref 37–140)
TSH SERPL-ACNC: 1.24 UIU/ML (ref 0.35–4.94)
VLDLC SERPL CALC-MCNC: 26 MG/DL
WBC # BLD AUTO: 8.61 X10(3)/MCL (ref 4.5–11.5)

## 2024-07-08 PROCEDURE — 84443 ASSAY THYROID STIM HORMONE: CPT

## 2024-07-08 PROCEDURE — 82306 VITAMIN D 25 HYDROXY: CPT

## 2024-07-08 PROCEDURE — 83036 HEMOGLOBIN GLYCOSYLATED A1C: CPT

## 2024-07-08 PROCEDURE — 97110 THERAPEUTIC EXERCISES: CPT

## 2024-07-08 PROCEDURE — 80061 LIPID PANEL: CPT

## 2024-07-08 PROCEDURE — 80053 COMPREHEN METABOLIC PANEL: CPT

## 2024-07-08 PROCEDURE — 85025 COMPLETE CBC W/AUTO DIFF WBC: CPT

## 2024-07-08 PROCEDURE — 36415 COLL VENOUS BLD VENIPUNCTURE: CPT

## 2024-07-08 NOTE — PROGRESS NOTES
[facility] THERAP[facility] AND [facility]LL[facility]   Reassessment / Physical Therapy Treatment Note      Name: Karol Malinais  Clinic Number: [id number]    Therapy Diagnosis:        Encounter Diagnoses   Name Primary?    Lumbar back pain Yes    Bilateral hip pain      Impaired flexibility of lower extremity      Decreased strength of lower extremity      Physician: Nicholas Rivas FNP    Visit Date: 7/8/2024    Medical Diagnosis from Referral: M54.5- Lumbar Back Pain  Evaluation Date: 6/4/2024  Authorization Period Expiration: 11/30/2024  Plan of Care Expiration: 11/30/2024  Reassessment / Plan of Care completed: RA- 7/8/24  Reassessment / Plan of Care due: 7/23/24  Visit # / Visits authorized: 5/ 12      PTA Visit #: 0/5     Time In: 0845  Time Out: 0930  Total Billable Time: 45 minutes    Subjective     Pt reports: her pain was worse after last PT session unable to come to next PT visit and had to cancel. She does not feel therapy is helping much. She thinks she had more relief when she went to chiropractor     She was compliant with home exercise program.  Response to previous treatment: increased pain  Functional change: no change in pain level, improved FOTO Score    Functional hip FOTO score: 58 (7/1/24) improved 40 (6/4/24)    Pain: 5/10 with Meloxicam  Location: B hips, low back     Objective      B hip strength: 4/5 MMT throughout with c/o increased groin pain with resisted hip flexion  Core muscle weakness: affecting bending, lifting, and carrying medium weight objects  Lumbar ROM: WNL throughout without c/o pain  Muscle tightness: B hamstrings, quadriceps, piriformis R > L  Tenderness: no tenderness with PA glides throughout lumbar spine or B SIJ     (+) KATHY and FADIR in R hip for pain but no crepitus     Relief of pain in LB and B hips after mechanical lumbar traction    Treatment     Karol received the treatments listed below:      therapeutic exercises to develop strength, flexibility, and core stabilization  for 45 minutes including:     Therapeutic Exercise   Therapeutic Exercise Grid     Exercise 1  Exercise 2  Exercise 3  Exercise 4    Exercise :    B LE Stretches:   Hamstrings,  Piriformis,  Quadriceps,  IT band B LE: 4 way hip Bridges with abd resistance with TB Prone reverse clams   Repetition/Time :    3 x 30 sec   2x10   15 x 5 sec   2x10     Resist or Assist :          Green TB   Green TB     Comment :                Done :    no no no no                    Exercise 5  Exercise 6  Exercise 7  Exercise 8    Exercise :    Sit to stands with weight   Dead lifts Abdominal press with SB with head lift   Leg press   Repetition/Time :    15   10   10 x 5 sec        Resist or Assist :    5 lbs   10 lbs           Comment :                  Done :    no   no no no                    Exercise 9  Exercise 10  Exercise 11  Exercise 12    Exercise :    squats   Waddle with TB B LE distraction   Manual lumbar traction     Repetition/Time :    10   5 x 15 sec   5 x 10 sec   2 minutes   Resist or Assist :       Green TB   Flexion, abduction, ER   white SB     Comment :          Attempted; no relief Minimal relief   Done :    no no no no                    Exercise 13 Exercise 14  Exercise 15  Exercise 16   Exercise :    Mechanical lumbar traction              Repetition/Time :    25 min              Resist or Assist :    65 #-30 sec  35 #-10 sec              Comment :                  Done :    yes                             Patient Education and Home Exercises       Education provided:   - importance and benefits of performing HEP daily for optimal improvements  - importance of strengthening core muscles and decreased muscle tightness to reduce stress on lumbar spine with daily activities    Written Home Exercises Provided: Patient instructed to cont prior HEP. Exercises were reviewed and Karol was able to demonstrate them prior to the end of the session.  Karol demonstrated good understanding of the education provided. See  EMR under Patient Instructions for exercises provided during therapy sessions    Assessment     Pt has completed 5 PT visits since initial eval. Pt progressing slowly with reduction of pain in B hips and low back, core and B hip strength, and muscle tightness continuing to limit tolerance with overall functional mobility however improvement in functional hip FOTO score. Mechanical lumbar traction performed today to help reduce pain and symptoms with pt educated on how it works. Pt had increased pain after last PT session having to cancel session. Discussed benefits of chiropractor services but importance of stretching and strengthening muscles that cause malalignment. Pt to continue with current POC, progress per pt's tolerance, and reassess pt at later date to determine need for additional therapy      Karol Is progressing well towards her goals.   Pt prognosis is Good.     Pt will continue to benefit from skilled outpatient physical therapy to address the deficits listed in the problem list box on initial evaluation, provide pt/family education and to maximize pt's level of independence in the home and community environment.     Pt's spiritual, cultural and educational needs considered and pt agreeable to plan of care and goals.     Anticipated barriers to physical therapy: chronic LBP and B hip pain     Goals:     Short Term Goals: 4 weeks      Pt will demonstrate knowledge and independence with HEP to continue with exercises outside of therapy to facilitate optimal overall improvements- progressing (pt reports performs HEP outside of therapy but not every day)     Pt will improve functional score on hip FOTO by >10 points which indicates improved ability to perform daily tasks with less difficulty and pain- met (58 (7/1/24) improved 40 (6/4/24))     Reduce c/o pain in B hips to <4/10 to improve tolerance with daily activities- progressing (c/o B hip and LBP 4-6/10 pain level with Meloxicam)     Improve strength  in B hips to 4+/5 MMT throughout in order to improve tolerance with overall functional mobility- progressing      Long Term Goals: 6 weeks      Pt will improve functional score on hip FOTO by >20 points which indicates improved ability to perform daily tasks with less difficulty and pain- progressing (58 (7/1/24) improved 40 (6/4/24))     Reduce c/o pain in B hips to <2/10 to improve tolerance with daily activities- progressing (c/o B hip and LBP 4-6/10 pain level with Meloxicam)     Improve strength in B hips to 5/5 MMT throughout in order to improve tolerance with overall functional mobility     Improve flexibility of B LE in order to reduce mechanical stressors on the lumbar spine- progressing     Pt will demonstrate proper bending and lifting techniques to reduce stress on lumbar spine- progressing     Improve core strength as evidence by able to carry / lift heavy objects with <2/10 pain level- progressing (continues to be limited with lifting due to pain)    Plan     Continue with current POC, progress per pt's tolerance, and reassess pt at later date to determine need for additional therapy    Nadia Coyle, PT

## 2024-07-09 ENCOUNTER — CLINICAL SUPPORT (OUTPATIENT)
Dept: REHABILITATION | Facility: HOSPITAL | Age: 24
End: 2024-07-09
Payer: COMMERCIAL

## 2024-07-09 DIAGNOSIS — M25.552 BILATERAL HIP PAIN: ICD-10-CM

## 2024-07-09 DIAGNOSIS — R29.898 DECREASED STRENGTH OF LOWER EXTREMITY: ICD-10-CM

## 2024-07-09 DIAGNOSIS — M54.50 LUMBAR BACK PAIN: Primary | ICD-10-CM

## 2024-07-09 DIAGNOSIS — M25.551 BILATERAL HIP PAIN: ICD-10-CM

## 2024-07-09 DIAGNOSIS — R29.898 IMPAIRED FLEXIBILITY OF LOWER EXTREMITY: ICD-10-CM

## 2024-07-09 PROCEDURE — 97110 THERAPEUTIC EXERCISES: CPT | Mod: CQ

## 2024-07-09 NOTE — PROGRESS NOTES
[facility] OUTPATIENT THERAPY AND WELLNESS   Physical Therapy Treatment Note      Name: Karol [facility] Number: [id number]    Therapy Diagnosis:        Encounter Diagnoses   Name Primary?    Lumbar back pain Yes    Bilateral hip pain      Impaired flexibility of lower extremity      Decreased strength of lower extremity      Physician: Nicholas Rivas FNP    Visit Date: 7/9/2024    Medical Diagnosis from Referral: M54.5- Lumbar Back Pain  Evaluation Date: 6/4/2024  Authorization Period Expiration: 11/30/2024  Plan of Care Expiration: 11/30/2024  Reassessment / Plan of Care completed: RA- 7/8/24  Reassessment / Plan of Care due: 7/23/24  Visit # / Visits authorized: 6/ 12      PTA Visit #: 1/5     Time In: 0850  Time Out: 0930  Total Billable Time: 40 minutes    Subjective     Pt reports: she feels a lot better today after the mechanical lumbar traction last session. She reported in felt like her low back was adjusted.     She was compliant with home exercise program.  Response to previous treatment: better  Functional change: reduced pain level, improved FOTO Score    Functional hip FOTO score: 58 (7/1/24) improved 40 (6/4/24)    Pain: 2/10 without meds  Location: B hips, low back     Objective      Core muscle weakness: affecting bending, lifting, and carrying medium weight objects  Lumbar ROM: WNL throughout without c/o pain  Muscle tightness: B hamstrings, quadriceps, piriformis R > L    Treatment     Karol received the treatments listed below:      therapeutic exercises to develop strength, flexibility, and core stabilization for 40 minutes including:     Therapeutic Exercise   Therapeutic Exercise Grid     Exercise 1  Exercise 2  Exercise 3  Exercise 4    Exercise :    B LE Stretches:   Hamstrings,  Piriformis,  Quadriceps,  IT band B LE: 4 way hip Bridges with abd resistance with TB Prone reverse clams   Repetition/Time :    3 x 30 sec   2x10   15 x 5 sec   2x10     Resist or Assist :          Green TB    Green TB     Comment :                Done :    yes yes yes yes                    Exercise 5  Exercise 6  Exercise 7  Exercise 8    Exercise :    Sit to stands with weight   Dead lifts Abdominal press with SB with head lift   Leg press   Repetition/Time :    15   10   2 x 10 x 5 sec        Resist or Assist :       10 lbs           Comment :    *focus on hip hinge              Done :    yes   no yes no                    Exercise 9  Exercise 10  Exercise 11  Exercise 12    Exercise :    squats   Waddle with TB B LE distraction   Manual lumbar traction     Repetition/Time :    10   4 x 15 sec   5 x 10 sec   2 minutes   Resist or Assist :       Green TB   Flexion, abduction, ER   white SB     Comment :          Attempted; no relief Minimal relief   Done :    no yes no no                    Exercise 13 Exercise 14  Exercise 15  Exercise 16   Exercise :    Mechanical lumbar traction   Dead bugs   Bird dogs        Repetition/Time :    25 min   2 x 5   2 x 5        Resist or Assist :    65 #-30 sec  35 #-10 sec              Comment :                  Done :    no   yes   yes                       Patient Education and Home Exercises       Education provided:   - importance and benefits of performing HEP daily for optimal improvements  - importance of strengthening core muscles and decreased muscle tightness to reduce stress on lumbar spine with daily activities    Written Home Exercises Provided: Patient instructed to cont prior HEP. Exercises were reviewed and Karol was able to demonstrate them prior to the end of the session.  Karol demonstrated good understanding of the education provided. See EMR under Patient Instructions for exercises provided during therapy sessions    Assessment     Karol demonstrated a (+) outcome after today's session as evidenced by report of decreased pain prior to mobility. She is making a good progression toward goals as evidenced by ability to perform additional dynamic core  strengthening exercises (bird dog) with report of tingling sensation and minimal pain in the lower back. Muscular instability noted in bilateral hips during bird dog exercise.   However she continues to demonstrate deficits with pain during activities, bilateral hip and lumbar weakness, core stability and strength. Recommend HEP/POC to address deficits mentioned above. Will look to include mechanical traction for the next session.       Karol Is progressing well towards her goals.   Pt prognosis is Good.     Pt will continue to benefit from skilled outpatient physical therapy to address the deficits listed in the problem list box on initial evaluation, provide pt/family education and to maximize pt's level of independence in the home and community environment.     Pt's spiritual, cultural and educational needs considered and pt agreeable to plan of care and goals.     Anticipated barriers to physical therapy: chronic LBP and B hip pain     Goals:     Short Term Goals: 4 weeks      Pt will demonstrate knowledge and independence with HEP to continue with exercises outside of therapy to facilitate optimal overall improvements- progressing (pt reports performs HEP outside of therapy but not every day)     Pt will improve functional score on hip FOTO by >10 points which indicates improved ability to perform daily tasks with less difficulty and pain- met (58 (7/1/24) improved 40 (6/4/24))     Reduce c/o pain in B hips to <4/10 to improve tolerance with daily activities- progressing (c/o B hip and LBP 4-6/10 pain level with Meloxicam)     Improve strength in B hips to 4+/5 MMT throughout in order to improve tolerance with overall functional mobility- progressing      Long Term Goals: 6 weeks      Pt will improve functional score on hip FOTO by >20 points which indicates improved ability to perform daily tasks with less difficulty and pain- progressing (58 (7/1/24) improved 40 (6/4/24))     Reduce c/o pain in B hips to  <2/10 to improve tolerance with daily activities- progressing (c/o B hip and LBP 4-6/10 pain level with Meloxicam)     Improve strength in B hips to 5/5 MMT throughout in order to improve tolerance with overall functional mobility     Improve flexibility of B LE in order to reduce mechanical stressors on the lumbar spine- progressing     Pt will demonstrate proper bending and lifting techniques to reduce stress on lumbar spine- progressing     Improve core strength as evidence by able to carry / lift heavy objects with <2/10 pain level- progressing (continues to be limited with lifting due to pain)    Plan     Continue with current POC, progress per pt's tolerance, and reassess pt at later date to determine need for additional therapy    Rosemary Guido PTA

## 2024-07-11 ENCOUNTER — PATIENT MESSAGE (OUTPATIENT)
Dept: FAMILY MEDICINE | Facility: CLINIC | Age: 24
End: 2024-07-11

## 2024-07-11 ENCOUNTER — OFFICE VISIT (OUTPATIENT)
Dept: FAMILY MEDICINE | Facility: CLINIC | Age: 24
End: 2024-07-11
Payer: COMMERCIAL

## 2024-07-11 VITALS
BODY MASS INDEX: 33.69 KG/M2 | HEART RATE: 79 BPM | TEMPERATURE: 98 F | HEIGHT: 64 IN | WEIGHT: 197.31 LBS | DIASTOLIC BLOOD PRESSURE: 87 MMHG | SYSTOLIC BLOOD PRESSURE: 128 MMHG

## 2024-07-11 DIAGNOSIS — G89.29 CHRONIC RIGHT SHOULDER PAIN: ICD-10-CM

## 2024-07-11 DIAGNOSIS — Z00.00 WELLNESS EXAMINATION: ICD-10-CM

## 2024-07-11 DIAGNOSIS — M25.511 CHRONIC RIGHT SHOULDER PAIN: ICD-10-CM

## 2024-07-11 DIAGNOSIS — F43.10 PTSD (POST-TRAUMATIC STRESS DISORDER): ICD-10-CM

## 2024-07-11 DIAGNOSIS — E66.9 OBESITY (BMI 30.0-34.9): ICD-10-CM

## 2024-07-11 DIAGNOSIS — E28.2 PCOS (POLYCYSTIC OVARIAN SYNDROME): ICD-10-CM

## 2024-07-11 DIAGNOSIS — M54.50 LUMBAR BACK PAIN: ICD-10-CM

## 2024-07-11 DIAGNOSIS — Z11.8 ENCOUNTER FOR SCREENING EXAMINATION FOR CHLAMYDIAL INFECTION: Primary | ICD-10-CM

## 2024-07-11 LAB
C TRACH DNA SPEC QL NAA+PROBE: NOT DETECTED
N GONORRHOEA DNA SPEC QL NAA+PROBE: NOT DETECTED
SOURCE (OHS): NORMAL

## 2024-07-11 PROCEDURE — 87491 CHLMYD TRACH DNA AMP PROBE: CPT | Performed by: NURSE PRACTITIONER

## 2024-07-11 RX ORDER — SEMAGLUTIDE 1.7 MG/.75ML
1.7 INJECTION, SOLUTION SUBCUTANEOUS
Qty: 0.75 ML | Refills: 4 | Status: SHIPPED | OUTPATIENT
Start: 2024-07-11

## 2024-07-11 RX ORDER — MELOXICAM 15 MG/1
1 TABLET ORAL DAILY
COMMUNITY
Start: 2024-06-21 | End: 2024-09-19

## 2024-07-11 RX ORDER — SEMAGLUTIDE 0.5 MG/.5ML
0.5 INJECTION, SOLUTION SUBCUTANEOUS
Qty: 0.5 ML | Refills: 6 | Status: SHIPPED | OUTPATIENT
Start: 2024-07-11 | End: 2024-07-11

## 2024-07-11 NOTE — PROGRESS NOTES
SUBJECTIVE:     History of Present Illness      Chief Complaint: wellness with  lab review  (Ozempic or mounjaro )    HPI:  Patient is a 23 y.o. year old female who presents to clinic for annual wellness and lab review.  Medical history includes hypertension, heart disease.  Patient recently established care with [facility] in [place].  Blood pressure under control with medication.  Patient does suffer with PTSD.  Denies SI, HI hallucinations.  States in the past she has tried counseling but has not had much success.    Patient states that she is still suffering with lower back in chronic shoulder pain.  Last lumbar x-ray completed six-month ago we will obtain MRI patient still continued to have pain we will conservative treatment.    Patient needs refill on Wegovy states that she has been gaining weight feeling more fatigued and sluggish.  Review of Systems:    Review of Systems    12 point review of systems conducted, negative except as stated in the history of present illness. See HPI for details.     Previous History    Nicholas Rivas, EDWARDP  Review of patient's allergies indicates:  No Known Allergies    Past Medical History:   Diagnosis Date    Genetic testing     MyRisk Negative, Remaining [employer] Risk 11.6%    Heart disease     Hx of migraine headaches     Hypertension     Insulin resistance     PCOS (polycystic ovarian syndrome)     PTSD (post-traumatic stress disorder)      Current Outpatient Medications   Medication Instructions    amLODIPine (NORVASC) 10 MG tablet     buPROPion (WELLBUTRIN XL) 150 mg, Oral, Every morning    ibuprofen (ADVIL,MOTRIN) 600 mg, Oral, Daily PRN    labetaloL (NORMODYNE) 200 MG tablet 1 tablet, Oral    LIDOcaine (LIDODERM) 5 % 1 patch, Transdermal, Daily    losartan-hydrochlorothiazide 50-12.5 mg (HYZAAR) 50-12.5 mg per tablet No dose, route, or frequency recorded.    meloxicam (MOBIC) 15 MG tablet 1 tablet, Oral, Daily    metFORMIN (GLUCOPHAGE-XR) 500 mg, Oral, With dinner     "norgestimate-ethinyl estradioL (ORTHO-CYCLEN) 0.25-35 mg-mcg per tablet 1 tablet, Oral, Daily    prazosin (MINIPRESS) 1 mg, Oral    spironolactone (ALDACTONE) 100 mg, Oral    tramadol-acetaminophen 37.5-325 mg (ULTRACET) 37.5-325 mg Tab 1 tablet, Oral, Every 6 hours PRN    UBRELVY 100 mg, Oral, Daily PRN    WEGOVY 1.7 mg, Subcutaneous, Every 7 days     Past Surgical History:   Procedure Laterality Date     SECTION      WISDOM TOOTH EXTRACTION       Family History   Problem Relation Name Age of Onset    Thyroid disease Mother      Breast cancer Maternal Grandmother      Left Breast Cancer Maternal Grandmother      Diabetes Maternal Grandfather         Social History     Tobacco Use    Smoking status: Every Day     Types: Vaping w/o nicotine    Smokeless tobacco: Never   Substance Use Topics    Alcohol use: Yes     Comment: socially    Drug use: Yes     Types: Marijuana     Comment: prescription marijuana        Health Maintenance      Health Maintenance   Topic Date Due    Hepatitis C Screening  Never done    Chlamydia Screening  Never done    TETANUS VACCINE  2022    Pap Smear  2026    Lipid Panel  Completed    HPV Vaccines  Completed       OBJECTIVE:     Physical Exam      Vital Signs Reviewed   Visit Vitals  /87   Pulse 79   Temp 97.7 °F (36.5 °C)   Ht 5' 4" (1.626 m)   Wt 89.5 kg (197 lb 4.8 oz)   LMP 2024   SpO2 (P) 98%   BMI 33.87 kg/m²       Physical Exam    Physical Exam:  General: Alert, well nourished, no acute distress, non-toxic appearing.   Eyes: Anicteric sclera, without conjunctival injection, normal lids, no purulent drainage, EOMs grossly intact.   Ears: No tragal tenderness. Tympanic membranes intact, pearly grey, without effusion or erythema and with a positive light reflex.   Mouth: Posterior pharynx without erythema. No exudate, ulcerations, or lesion. No tonsillar swelling.   Neck: Supple, full ROM, no rigidity, no cervical adenopathy.   Cardio: Normal rate and " rhythm    Resp: Respirations even and unlabored, clear to auscultation bilaterally.   Abd: No ecchymosis or distension. Normal bowel sounds in all 4 quadrants. No tenderness to palpation. No rebound tenderness or guarding. No CVA tenderness.   Skin: No rashes or open lesions noted.   MSK: No swelling. No abrasions or signs of trauma. Ambulating without assistance.   Neuro: Alert,oriented No focal deficits noted. Facial expressions even.   Psych: Cooperative, Normal affect        Labs     Results for orders placed or performed in visit on 07/08/24   Comprehensive Metabolic Panel   Result Value Ref Range    Sodium 138 136 - 145 mmol/L    Potassium 4.2 3.5 - 5.1 mmol/L    Chloride 106 98 - 107 mmol/L    CO2 26 22 - 29 mmol/L    Glucose 102 (H) 74 - 100 mg/dL    Blood Urea Nitrogen 11.2 7.0 - 18.7 mg/dL    Creatinine 0.78 0.55 - 1.02 mg/dL    Calcium 9.8 8.4 - 10.2 mg/dL    Protein Total 8.1 6.4 - 8.3 gm/dL    Albumin 3.8 3.5 - 5.0 g/dL    Globulin 4.3 (H) 2.4 - 3.5 gm/dL    Albumin/Globulin Ratio 0.9 (L) 1.1 - 2.0 ratio    Bilirubin Total 0.4 <=1.5 mg/dL    ALP 88 40 - 150 unit/L    ALT 16 0 - 55 unit/L    AST 17 5 - 34 unit/L    eGFR >60 mL/min/1.73/m2    Anion Gap 6.0 mEq/L    BUN/Creatinine Ratio 14    Lipid Panel   Result Value Ref Range    Cholesterol Total 254 (H) <=200 mg/dL    HDL Cholesterol 50 35 - 60 mg/dL    Triglyceride 132 37 - 140 mg/dL    Cholesterol/HDL Ratio 5 0 - 5    Very Low Density Lipoprotein 26     LDL Cholesterol 178.00 (H) 50.00 - 140.00 mg/dL   TSH   Result Value Ref Range    TSH 1.241 0.350 - 4.940 uIU/mL   Hemoglobin A1C   Result Value Ref Range    Hemoglobin A1c 5.2 <=7.0 %    Estimated Average Glucose 102.5 mg/dL   Vitamin D   Result Value Ref Range    Vitamin D 19 (L) 30 - 80 ng/mL   CBC with Differential   Result Value Ref Range    WBC 8.61 4.50 - 11.50 x10(3)/mcL    RBC 4.84 4.20 - 5.40 x10(6)/mcL    Hgb 13.3 12.0 - 16.0 g/dL    Hct 40.7 37.0 - 47.0 %    MCV 84.1 80.0 - 94.0 fL    MCH  "27.5 27.0 - 31.0 pg    MCHC 32.7 (L) 33.0 - 36.0 g/dL    RDW 13.2 11.5 - 17.0 %    Platelet 232 130 - 400 x10(3)/mcL    MPV 10.9 (H) 7.4 - 10.4 fL    Neut % 60.9 %    Lymph % 31.2 %    Mono % 5.8 %    Eos % 1.4 %    Basophil % 0.2 %    Lymph # 2.69 0.6 - 4.6 x10(3)/mcL    Neut # 5.24 2.1 - 9.2 x10(3)/mcL    Mono # 0.50 0.1 - 1.3 x10(3)/mcL    Eos # 0.12 0 - 0.9 x10(3)/mcL    Baso # 0.02 <=0.2 x10(3)/mcL    IG# 0.04 0 - 0.04 x10(3)/mcL    IG% 0.5 %       Chemistry:  Lab Results   Component Value Date     07/08/2024    K 4.2 07/08/2024    BUN 11.2 07/08/2024    CREATININE 0.78 07/08/2024    EGFRNORACEVR >60 07/08/2024    GLUCOSE 102 (H) 07/08/2024    CALCIUM 9.8 07/08/2024    ALKPHOS 88 07/08/2024    LABPROT 8.1 07/08/2024    ALBUMIN 3.8 07/08/2024    AST 17 07/08/2024    ALT 16 07/08/2024    [id number] 19 (L) 07/08/2024    TSH 1.241 07/08/2024        Lab Results   Component Value Date    HGBA1C 5.2 07/08/2024        Hematology:  Lab Results   Component Value Date    WBC 8.61 07/08/2024    HGB 13.3 07/08/2024    HCT 40.7 07/08/2024     07/08/2024       Lipid Panel:  Lab Results   Component Value Date    CHOL 254 (H) 07/08/2024    HDL 50 07/08/2024    .00 (H) 07/08/2024    TRIG 132 07/08/2024    TOTALCHOLEST 5 07/08/2024        Urine:  No results found for: "COLORUA", "APPEARANCEUA", "SGUA", "PHUA", "PROTEINUA", "GLUCOSEUA", "KETONESUA", "BLOODUA", "NITRITESUA", "LEUKOCYTESUR", "RBCUA", "WBCUA", "BACTERIA", "SQEPUA", "HYALINECASTS", "CREATRANDUR", "PROTEINURINE", "UPROTCREA"      Assessment            ICD-10-CM ICD-9-CM   1. Encounter for screening examination for chlamydial infection  Z11.8 V73.98   2. Wellness examination  Z00.00 V70.0   3. PTSD (post-traumatic stress disorder)  F43.10 309.81   4. Obesity (BMI 30.0-34.9)  E66.9 278.00   5. PCOS (polycystic ovarian syndrome)  E28.2 256.4   6. Chronic right shoulder pain  M25.511 719.41    G89.29 338.29       Plan       1. Wellness " examination  Discussed labs and preventative screenings   Overall health status reviewed.    Significant chronic conditions addressed, including ongoing treatment plans.   Good health habits reinforced.    Cardiovascular disease risk factors discussed.   Appropriate recommendations and preventative care medical   information provided with annual wellness exams encouraged.  Vaccination status     Cervical up-to-date    2. Encounter for screening examination for chlamydial infection  - Chlamydia/GC, PCR    3. PTSD (post-traumatic stress disorder)  - Ambulatory referral/consult to Psychiatry; Future  Denies SI, HI hallucinations.    Establish good family/social support, reading, meditation, physical activity.  ED precautions discussed    4. Obesity (BMI 30.0-34.9)  - semaglutide, weight loss, (WEGOVY) 1.7 mg/0.75 mL PnIj; Inject 1.7 mg into the skin every 7 days.  Dispense: 0.75 mL; Refill: 4    5. PCOS (polycystic ovarian syndrome)  - semaglutide, weight loss, (WEGOVY) 1.7 mg/0.75 mL PnIj; Inject 1.7 mg into the skin every 7 days.  Dispense: 0.75 mL; Refill: 4    6. Chronic right shoulder pain  - Ambulatory referral/consult to Orthopedics; Future    7. Lumbar pain  MRI ordered continue physical therapy  Orders Placed This Encounter    Ambulatory referral/consult to Psychiatry    Ambulatory referral/consult to Orthopedics    semaglutide, weight loss, (WEGOVY) 1.7 mg/0.75 mL PnIj      Medication List with Changes/Refills   New Medications    SEMAGLUTIDE, WEIGHT LOSS, (WEGOVY) 1.7 MG/0.75 ML PNIJ    Inject 1.7 mg into the skin every 7 days.   Current Medications    AMLODIPINE (NORVASC) 10 MG TABLET        BUPROPION (WELLBUTRIN XL) 150 MG TB24 TABLET    Take 150 mg by mouth every morning.    IBUPROFEN (ADVIL,MOTRIN) 600 MG TABLET    Take 600 mg by mouth daily as needed.    LABETALOL (NORMODYNE) 200 MG TABLET    Take 1 tablet by mouth.    LIDOCAINE (LIDODERM) 5 %    Place 1 patch onto the skin once daily.     LOSARTAN-HYDROCHLOROTHIAZIDE 50-12.5 MG (HYZAAR) 50-12.5 MG PER TABLET        MELOXICAM (MOBIC) 15 MG TABLET    Take 1 tablet by mouth once daily.    METFORMIN (GLUCOPHAGE-XR) 500 MG ER 24HR TABLET    Take 1 tablet (500 mg total) by mouth daily with dinner or evening meal.    NORGESTIMATE-ETHINYL ESTRADIOL (ORTHO-CYCLEN) 0.25-35 MG-MCG PER TABLET    Take 1 tablet by mouth once daily.    PRAZOSIN (MINIPRESS) 1 MG CAP    Take 1 mg by mouth.    SPIRONOLACTONE (ALDACTONE) 100 MG TABLET    Take 100 mg by mouth.    TRAMADOL-ACETAMINOPHEN 37.5-325 MG (ULTRACET) 37.5-325 MG TAB    Take 1 tablet by mouth every 6 (six) hours as needed.    UBROGEPANT (UBRELVY) 100 MG TABLET    Take 100 mg by mouth daily as needed.       Follow up in about 6 weeks (around 8/22/2024).   Follow up in about 6 weeks (around 8/22/2024). In addition to their scheduled follow up, the patient has also been instructed to follow up on as needed basis.   Future Appointments   Date Time Provider Department Center   7/15/2024  9:30 AM Rico Golden PTA [facility]   7/16/2024  8:45 AM Rosemary Guido, MELONY [facility]   7/16/2024 10:00 AM [facility] MRI1 650 LB LIMIT [facility]   7/22/2024  8:45 AM Rico Golden PTA [facility]   7/23/2024  8:45 AM Nadia Coyle, PT [facility]   7/29/2024  9:30 AM Rosemary Guido PTA [facility]   7/30/2024  8:45 AM Nadia Coyle PT [facility]   8/26/2024  8:15 AM Nicholas Rivas FNP [facility]   9/12/2024  1:00 PM Micheal Cartwright MD [facility] [place] [facility]   7/17/2025  8:30 AM Nicholas Rivas FNP [facility]

## 2024-07-16 ENCOUNTER — E-VISIT (OUTPATIENT)
Dept: FAMILY MEDICINE | Facility: CLINIC | Age: 24
End: 2024-07-16
Payer: COMMERCIAL

## 2024-07-16 DIAGNOSIS — N89.8 VAGINAL DISCHARGE: ICD-10-CM

## 2024-07-16 DIAGNOSIS — Z11.3 SCREEN FOR STD (SEXUALLY TRANSMITTED DISEASE): Primary | ICD-10-CM

## 2024-07-17 ENCOUNTER — LAB VISIT (OUTPATIENT)
Dept: LAB | Facility: HOSPITAL | Age: 24
End: 2024-07-17
Attending: NURSE PRACTITIONER
Payer: COMMERCIAL

## 2024-07-17 DIAGNOSIS — Z11.3 SCREEN FOR STD (SEXUALLY TRANSMITTED DISEASE): ICD-10-CM

## 2024-07-17 PROCEDURE — 87389 HIV-1 AG W/HIV-1&-2 AB AG IA: CPT

## 2024-07-17 PROCEDURE — 86780 TREPONEMA PALLIDUM: CPT

## 2024-07-17 PROCEDURE — 36415 COLL VENOUS BLD VENIPUNCTURE: CPT

## 2024-07-17 RX ORDER — METRONIDAZOLE 7.5 MG/G
1 GEL VAGINAL 2 TIMES DAILY
Qty: 70 G | Refills: 0 | Status: SHIPPED | OUTPATIENT
Start: 2024-07-17

## 2024-07-18 LAB
HIV 1+2 AB+HIV1 P24 AG SERPL QL IA: NONREACTIVE
T PALLIDUM AB SER QL: NONREACTIVE

## 2024-07-26 ENCOUNTER — PATIENT MESSAGE (OUTPATIENT)
Dept: FAMILY MEDICINE | Facility: CLINIC | Age: 24
End: 2024-07-26
Payer: COMMERCIAL

## 2024-08-21 ENCOUNTER — OFFICE VISIT (OUTPATIENT)
Dept: ORTHOPEDICS | Facility: CLINIC | Age: 24
End: 2024-08-21
Payer: COMMERCIAL

## 2024-08-21 DIAGNOSIS — M25.511 CHRONIC PAIN OF BOTH SHOULDERS: Primary | ICD-10-CM

## 2024-08-21 DIAGNOSIS — M75.102 NONTRAUMATIC TEAR OF LEFT ROTATOR CUFF, UNSPECIFIED TEAR EXTENT: ICD-10-CM

## 2024-08-21 DIAGNOSIS — M25.511 CHRONIC RIGHT SHOULDER PAIN: ICD-10-CM

## 2024-08-21 DIAGNOSIS — G89.29 CHRONIC RIGHT SHOULDER PAIN: ICD-10-CM

## 2024-08-21 DIAGNOSIS — M25.512 CHRONIC PAIN OF BOTH SHOULDERS: Primary | ICD-10-CM

## 2024-08-21 DIAGNOSIS — S43.432A GLENOID LABRAL TEAR, LEFT, INITIAL ENCOUNTER: ICD-10-CM

## 2024-08-21 DIAGNOSIS — M75.51 ACUTE SHOULDER BURSITIS, RIGHT: ICD-10-CM

## 2024-08-21 DIAGNOSIS — T14.8XXA CARTILAGE TEAR: ICD-10-CM

## 2024-08-21 DIAGNOSIS — G89.29 CHRONIC PAIN OF BOTH SHOULDERS: Primary | ICD-10-CM

## 2024-08-21 PROCEDURE — 99204 OFFICE O/P NEW MOD 45 MIN: CPT | Mod: ,,, | Performed by: ORTHOPAEDIC SURGERY

## 2024-08-21 PROCEDURE — 1159F MED LIST DOCD IN RCRD: CPT | Mod: CPTII,,, | Performed by: ORTHOPAEDIC SURGERY

## 2024-08-21 PROCEDURE — 3044F HG A1C LEVEL LT 7.0%: CPT | Mod: CPTII,,, | Performed by: ORTHOPAEDIC SURGERY

## 2024-08-21 PROCEDURE — 1160F RVW MEDS BY RX/DR IN RCRD: CPT | Mod: CPTII,,, | Performed by: ORTHOPAEDIC SURGERY

## 2024-08-22 NOTE — PROGRESS NOTES
Subjective:    CC: Pain of the Left Shoulder and Pain of the Right Shoulder (AINSLEY shoulder pain. Started hurting in 2020. Pain radiates into neck when she lifts up her arm. Has numbness and tingling if arms are out for too long or lifts them up. Has popping and cracking when she moves them. Not able to lift anything and hard to  things over 30lbs. Locks up on her and has severe pain. No other concerns with them)       HPI:  Patient comes in today for her 1st visit.  Patient complains of bilateral shoulder pain left greater than right.  Patient states she has been having shoulder pain for the last 4 years.  Now she is having some popping in his shoulder in certain motions.  She has some shooting pains from her left shoulder into her neck region.  Occasionally she will have some numbness going down her fingers though minimal.  She has tried rest medication exercises without relief.    ROS: Refer to HPI for pertinent ROS. All other 12 point systems negative.    Objective:  There were no vitals filed for this visit.     Physical Exam:  Patient is well-nourished and well-developed, in no apparent distress, pleasant and cooperative. Examination of the left upper extremity compartments are soft and warm.  Skin is intact. There are no signs or symptoms of DVT or infection.   Patient is tender to palpation along the  posterolateral aspect .  Patient is able to forward flex and abduct to 130 with a hesitation.  Positive Paredes and Neers, positive Brooklyn's, positive empty can, questionable drop arm test. Negative sulcus sign. Stable to stressing. Neurovascularly intact distally.  Examination of the right shoulder she has some mild tenderness along the superior aspect.  She is able to forward flex and abduct 160° stable to stressing, neurovascular intact distally.    Images:  X-rays three views of the left and right shoulder demonstrate no obvious fracture or dislocation.. Images Reviewed and discussed with  patient.    Assessment:  1. Chronic right shoulder pain  - Ambulatory referral/consult to Orthopedics    2. Chronic pain of both shoulders  - X-Ray Shoulder Complete Bilateral; Future    3. Glenoid labral tear, left, initial encounter    4. Cartilage tear    5. Nontraumatic tear of left rotator cuff, unspecified tear extent  - MRI Shoulder Without Contrast Left    6. Acute shoulder bursitis, right  - MRI Shoulder Without Contrast Left        Plan:  At this time we discussed her physical exam and both x-ray findings.  Patient's main left shoulder pain, loss of motion and crepitance.  She has failed conservative treatment over the last 4 years.  We have also discussed possible contribution from his cervical spine.  We will proceed with a MRI of her left shoulder, I would like see back next week with her results.    Follow UP: No follow-ups on file.

## 2024-08-26 ENCOUNTER — OFFICE VISIT (OUTPATIENT)
Dept: FAMILY MEDICINE | Facility: CLINIC | Age: 24
End: 2024-08-26
Payer: COMMERCIAL

## 2024-08-26 VITALS
SYSTOLIC BLOOD PRESSURE: 117 MMHG | TEMPERATURE: 98 F | HEART RATE: 69 BPM | HEIGHT: 64 IN | BODY MASS INDEX: 34.18 KG/M2 | WEIGHT: 200.19 LBS | DIASTOLIC BLOOD PRESSURE: 79 MMHG

## 2024-08-26 DIAGNOSIS — M25.511 CHRONIC RIGHT SHOULDER PAIN: ICD-10-CM

## 2024-08-26 DIAGNOSIS — M54.50 LUMBAR BACK PAIN: Primary | ICD-10-CM

## 2024-08-26 DIAGNOSIS — G89.29 CHRONIC RIGHT SHOULDER PAIN: ICD-10-CM

## 2024-08-26 PROCEDURE — 1159F MED LIST DOCD IN RCRD: CPT | Mod: CPTII,,, | Performed by: NURSE PRACTITIONER

## 2024-08-26 PROCEDURE — 3074F SYST BP LT 130 MM HG: CPT | Mod: CPTII,,, | Performed by: NURSE PRACTITIONER

## 2024-08-26 PROCEDURE — 99213 OFFICE O/P EST LOW 20 MIN: CPT | Mod: ,,, | Performed by: NURSE PRACTITIONER

## 2024-08-26 PROCEDURE — 3044F HG A1C LEVEL LT 7.0%: CPT | Mod: CPTII,,, | Performed by: NURSE PRACTITIONER

## 2024-08-26 PROCEDURE — 3078F DIAST BP <80 MM HG: CPT | Mod: CPTII,,, | Performed by: NURSE PRACTITIONER

## 2024-08-26 PROCEDURE — 3008F BODY MASS INDEX DOCD: CPT | Mod: CPTII,,, | Performed by: NURSE PRACTITIONER

## 2024-08-26 NOTE — PROGRESS NOTES
SUBJECTIVE:     History of Present Illness      Chief Complaint: Follow-up (6 week f/u )    HPI:  Patient is a 24 y.o. year old female who presents to clinic for 6 week follow-up.  Patient has started physical therapy for shoulder pain.  She still has some intermittent pain to her lower back.    Review of Systems:    Review of Systems    12 point review of systems conducted, negative except as stated in the history of present illness. See HPI for details.     Previous History    Nicholas Rivas, EDWARDP  Review of patient's allergies indicates:  No Known Allergies    Past Medical History:   Diagnosis Date    Genetic testing     MyRisk Negative, Remaining Lifitime Risk 11.6%    Heart disease     Hx of migraine headaches     Hypertension     Insulin resistance     PCOS (polycystic ovarian syndrome)     PTSD (post-traumatic stress disorder)      Current Outpatient Medications   Medication Instructions    amLODIPine (NORVASC) 10 MG tablet     buPROPion (WELLBUTRIN XL) 150 mg, Oral, Every morning    ibuprofen (ADVIL,MOTRIN) 600 mg, Oral, Daily PRN    labetaloL (NORMODYNE) 200 MG tablet 1 tablet, Oral    LIDOcaine (LIDODERM) 5 % 1 patch, Transdermal, Daily    losartan-hydrochlorothiazide 50-12.5 mg (HYZAAR) 50-12.5 mg per tablet No dose, route, or frequency recorded.    meloxicam (MOBIC) 15 MG tablet 1 tablet, Oral, Daily    metFORMIN (GLUCOPHAGE-XR) 500 mg, Oral, With dinner    norgestimate-ethinyl estradioL (ORTHO-CYCLEN) 0.25-35 mg-mcg per tablet 1 tablet, Oral, Daily    prazosin (MINIPRESS) 1 mg, Oral    spironolactone (ALDACTONE) 100 mg, Oral    tramadol-acetaminophen 37.5-325 mg (ULTRACET) 37.5-325 mg Tab 1 tablet, Oral, Every 6 hours PRN    UBRELVY 100 mg, Oral, Daily PRN    WEGOVY 1.7 mg, Subcutaneous, Every 7 days     Past Surgical History:   Procedure Laterality Date     SECTION      WISDOM TOOTH EXTRACTION       Family History   Problem Relation Name Age of Onset    Thyroid disease Mother      Breast  "cancer Maternal Grandmother      Left Breast Cancer Maternal Grandmother      Diabetes Maternal Grandfather         Social History     Tobacco Use    Smoking status: Every Day     Types: Vaping w/o nicotine    Smokeless tobacco: Never   Substance Use Topics    Alcohol use: Yes     Comment: socially    Drug use: Yes     Types: Marijuana     Comment: prescription marijuana        Health Maintenance      Health Maintenance   Topic Date Due    Hepatitis C Screening  Never done    TETANUS VACCINE  11/07/2022    Chlamydia Screening  07/11/2025    Pap Smear  08/29/2026    Lipid Panel  Completed    HPV Vaccines  Completed       OBJECTIVE:     Physical Exam      Vital Signs Reviewed   Visit Vitals  /79   Pulse 69   Temp 97.5 °F (36.4 °C)   Ht 5' 4" (1.626 m)   Wt 90.8 kg (200 lb 3.2 oz)   SpO2 (P) 100%   BMI 34.36 kg/m²       Physical Exam    Physical Exam:  General: Alert, well nourished, no acute distress, non-toxic appearing.   Eyes: Anicteric sclera, without conjunctival injection, normal lids, no purulent drainage, EOMs grossly intact.   Ears: No tragal tenderness. Tympanic membranes intact, pearly grey, without effusion or erythema and with a positive light reflex.   Mouth: Posterior pharynx without erythema. No exudate, ulcerations, or lesion. No tonsillar swelling.   Neck: Supple, full ROM, no rigidity, no cervical adenopathy.   Cardio: Normal rate and rhythm    Resp: Respirations even and unlabored, clear to auscultation bilaterally.   Abd: No ecchymosis or distension. Normal bowel sounds in all 4 quadrants. No tenderness to palpation. No rebound tenderness or guarding. No CVA tenderness.   Skin: No rashes or open lesions noted.   MSK: No swelling. No abrasions or signs of trauma. Ambulating without assistance.   Neuro: Alert,oriented No focal deficits noted. Facial expressions even.   Psych: Cooperative, Normal affect      Procedures    Procedures     Labs     Results for orders placed or performed in visit " "on 07/17/24   HIV 1/2 Ag/Ab (4th Gen)   Result Value Ref Range    HIV Nonreactive Nonreactive   SYPHILIS ANTIBODY (WITH REFLEX RPR)   Result Value Ref Range    Syphilis Antibody Nonreactive Nonreactive, Equivocal       Chemistry:  Lab Results   Component Value Date     07/08/2024    K 4.2 07/08/2024    BUN 11.2 07/08/2024    CREATININE 0.78 07/08/2024    EGFRNORACEVR >60 07/08/2024    GLUCOSE 102 (H) 07/08/2024    CALCIUM 9.8 07/08/2024    ALKPHOS 88 07/08/2024    LABPROT 8.1 07/08/2024    ALBUMIN 3.8 07/08/2024    AST 17 07/08/2024    ALT 16 07/08/2024    [id number] 19 (L) 07/08/2024    TSH 1.241 07/08/2024        Lab Results   Component Value Date    HGBA1C 5.2 07/08/2024        Hematology:  Lab Results   Component Value Date    WBC 8.61 07/08/2024    HGB 13.3 07/08/2024    HCT 40.7 07/08/2024     07/08/2024       Lipid Panel:  Lab Results   Component Value Date    CHOL 254 (H) 07/08/2024    HDL 50 07/08/2024    .00 (H) 07/08/2024    TRIG 132 07/08/2024    TOTALCHOLEST 5 07/08/2024        Urine:  No results found for: "COLORUA", "APPEARANCEUA", "SGUA", "PHUA", "PROTEINUA", "GLUCOSEUA", "KETONESUA", "BLOODUA", "NITRITESUA", "LEUKOCYTESUR", "RBCUA", "WBCUA", "BACTERIA", "SQEPUA", "HYALINECASTS", "CREATRANDUR", "PROTEINURINE", "UPROTCREA"      Assessment            ICD-10-CM ICD-9-CM   1. Lumbar back pain  M54.50 724.2   2. Chronic right shoulder pain  M25.511 719.41    G89.29 338.29       Plan       1. Lumbar back pain  Referred to physical therapy  2. Chronic right shoulder pain   patient currently being worked up by Orthopedic  Orders Placed This Encounter    Ambulatory referral/consult to Physical/Occupational Therapy      Medication List with Changes/Refills   Current Medications    AMLODIPINE (NORVASC) 10 MG TABLET        BUPROPION (WELLBUTRIN XL) 150 MG TB24 TABLET    Take 150 mg by mouth every morning.    IBUPROFEN (ADVIL,MOTRIN) 600 MG TABLET    Take 600 mg by mouth daily as needed.    " LABETALOL (NORMODYNE) 200 MG TABLET    Take 1 tablet by mouth.    LIDOCAINE (LIDODERM) 5 %    Place 1 patch onto the skin once daily.    LOSARTAN-HYDROCHLOROTHIAZIDE 50-12.5 MG (HYZAAR) 50-12.5 MG PER TABLET        MELOXICAM (MOBIC) 15 MG TABLET    Take 1 tablet by mouth once daily.    METFORMIN (GLUCOPHAGE-XR) 500 MG ER 24HR TABLET    Take 1 tablet (500 mg total) by mouth daily with dinner or evening meal.    NORGESTIMATE-ETHINYL ESTRADIOL (ORTHO-CYCLEN) 0.25-35 MG-MCG PER TABLET    Take 1 tablet by mouth once daily.    PRAZOSIN (MINIPRESS) 1 MG CAP    Take 1 mg by mouth.    SEMAGLUTIDE, WEIGHT LOSS, (WEGOVY) 1.7 MG/0.75 ML PNIJ    Inject 1.7 mg into the skin every 7 days.    SPIRONOLACTONE (ALDACTONE) 100 MG TABLET    Take 100 mg by mouth.    TRAMADOL-ACETAMINOPHEN 37.5-325 MG (ULTRACET) 37.5-325 MG TAB    Take 1 tablet by mouth every 6 (six) hours as needed.    UBROGEPANT (UBRELVY) 100 MG TABLET    Take 100 mg by mouth daily as needed.   Discontinued Medications    METRONIDAZOLE (METROGEL) 0.75 % (37.5MG/5 GRAM) VAGINAL GEL    Place 1 applicator vaginally 2 (two) times daily.       No follow-ups on file.   No follow-ups on file. In addition to their scheduled follow up, the patient has also been instructed to follow up on as needed basis.   Future Appointments   Date Time Provider Department Center   9/5/2024  9:00 AM [facility] MRI1 650 LB LIMIT [facility] MRI [facility]   9/11/2024 10:30 AM Александр Roach MD [facility] ORTHO [facility]   9/12/2024  1:00 PM Micheal Cartwright MD [facility] [place] [facility]   7/17/2025  8:30 AM Nicholas Rivas FNP [facility] FAMMED [facility] Cl

## 2024-09-03 ENCOUNTER — DOCUMENTATION ONLY (OUTPATIENT)
Dept: REHABILITATION | Facility: HOSPITAL | Age: 24
End: 2024-09-03
Payer: COMMERCIAL

## 2024-09-03 NOTE — PROGRESS NOTES
[facility]  Physical Therapy Discharge Note    Name: Karol Flanagan  Clinic Number: [id number]    Medical diagnosis: M54.5- Lumbar Back Pain   PT diagnosis: lumbar back pain, B hip pain, muscle tightness, decreased LE strength    Date of Last visit: 7/9/24  Total Visits Received: 6 plus initial eval    Assessment      Karol Flanagan did not return to physical therapy today for final assessment for discharge.  Karol goals were address as listed below and met as stated.  Karol was issued a home exercise program with handouts throughout this episode of care to reference for continued wellness and physical fitness . Contact information was given at evaluation in case any questions arise in the future or if therapy is needed.    Discharge reason: patient did not return for formal physical therapy. Multiple attempts made to schedule additional PT visits however pt stated she is working and would call back to schedule around her work schedule however on return calls       Plan   This patient is discharged from Physical Therapy.

## 2024-09-10 ENCOUNTER — PATIENT MESSAGE (OUTPATIENT)
Dept: ORTHOPEDICS | Facility: CLINIC | Age: 24
End: 2024-09-10
Payer: COMMERCIAL

## 2024-09-13 DIAGNOSIS — E66.9 OBESITY (BMI 30.0-34.9): ICD-10-CM

## 2024-09-13 DIAGNOSIS — E28.2 PCOS (POLYCYSTIC OVARIAN SYNDROME): ICD-10-CM

## 2024-09-13 RX ORDER — SEMAGLUTIDE 1.7 MG/.75ML
1.7 INJECTION, SOLUTION SUBCUTANEOUS
Qty: 0.75 ML | Refills: 4 | Status: SHIPPED | OUTPATIENT
Start: 2024-09-13

## 2024-12-11 ENCOUNTER — TELEPHONE (OUTPATIENT)
Dept: FAMILY MEDICINE | Facility: CLINIC | Age: 24
End: 2024-12-11
Payer: COMMERCIAL

## 2024-12-18 ENCOUNTER — OFFICE VISIT (OUTPATIENT)
Dept: FAMILY MEDICINE | Facility: CLINIC | Age: 24
End: 2024-12-18
Payer: COMMERCIAL

## 2024-12-18 VITALS
DIASTOLIC BLOOD PRESSURE: 79 MMHG | RESPIRATION RATE: 18 BRPM | HEIGHT: 64 IN | HEART RATE: 74 BPM | WEIGHT: 188.38 LBS | BODY MASS INDEX: 32.16 KG/M2 | SYSTOLIC BLOOD PRESSURE: 117 MMHG | TEMPERATURE: 98 F | OXYGEN SATURATION: 97 %

## 2024-12-18 DIAGNOSIS — F43.10 PTSD (POST-TRAUMATIC STRESS DISORDER): ICD-10-CM

## 2024-12-18 DIAGNOSIS — I10 HYPERTENSION, UNSPECIFIED TYPE: ICD-10-CM

## 2024-12-18 DIAGNOSIS — G89.29 CHRONIC RIGHT SHOULDER PAIN: ICD-10-CM

## 2024-12-18 DIAGNOSIS — M54.50 LUMBAR BACK PAIN: Primary | ICD-10-CM

## 2024-12-18 DIAGNOSIS — M54.9 DORSALGIA, UNSPECIFIED: ICD-10-CM

## 2024-12-18 DIAGNOSIS — M25.511 CHRONIC RIGHT SHOULDER PAIN: ICD-10-CM

## 2024-12-18 PROCEDURE — 1159F MED LIST DOCD IN RCRD: CPT | Mod: CPTII,,, | Performed by: NURSE PRACTITIONER

## 2024-12-18 PROCEDURE — 99213 OFFICE O/P EST LOW 20 MIN: CPT | Mod: ,,, | Performed by: NURSE PRACTITIONER

## 2024-12-18 PROCEDURE — 3044F HG A1C LEVEL LT 7.0%: CPT | Mod: CPTII,,, | Performed by: NURSE PRACTITIONER

## 2024-12-18 PROCEDURE — 3074F SYST BP LT 130 MM HG: CPT | Mod: CPTII,,, | Performed by: NURSE PRACTITIONER

## 2024-12-18 PROCEDURE — 3008F BODY MASS INDEX DOCD: CPT | Mod: CPTII,,, | Performed by: NURSE PRACTITIONER

## 2024-12-18 PROCEDURE — 3078F DIAST BP <80 MM HG: CPT | Mod: CPTII,,, | Performed by: NURSE PRACTITIONER

## 2024-12-18 NOTE — PROGRESS NOTES
SUBJECTIVE:     History of Present Illness      Chief Complaint: Numbness (C/o numbness, tingling and needle sticks to tong lower etxs. The sensation starts in hips and radiates down to feet. The sensation is constant and has been going on for a while but it is getting worse.)    HPI:  Patient is a 24 y.o. year old female who presents to clinic for lower back pain follow-up.  Patient continues to have lower lumbar back pain that radiates down to her feet.  States she does have a tingling sensation to bilateral lower extremities.  She has not had any relief with OTC ibuprofen or Tylenol for pain.  Patient has completed home exercise including stretching, no relief.   Patient reports some episodes of urinary  incontinence.  Pain progressively getting worse.      Patient's last MRI December 2023.    Follow up with cardiologist for hypertensive heart disease.    Follow up with mental health for anxiety PTSD depression.     She follows up with ortho    Review of Systems:    Review of Systems    12 point review of systems conducted, negative except as stated in the history of present illness. See HPI for details.     Previous History    Nicholas Rivas, HONG  Review of patient's allergies indicates:  No Known Allergies    Past Medical History:   Diagnosis Date    Genetic testing     MyRisk Negative, Remaining Lifitime Risk 11.6%    GERD (gastroesophageal reflux disease)     Headache due to hypertension     Heart disease     Hx of migraine headaches     Hypertension     Insulin resistance     PCOS (polycystic ovarian syndrome)     PTSD (post-traumatic stress disorder)      Current Outpatient Medications   Medication Instructions    amLODIPine (NORVASC) 10 MG tablet     buPROPion (WELLBUTRIN XL) 150 mg, Every morning    ibuprofen (ADVIL,MOTRIN) 600 mg, Daily PRN    labetaloL (NORMODYNE) 200 MG tablet 1 tablet    LIDOcaine (LIDODERM) 5 % 1 patch, Daily    losartan-hydrochlorothiazide 50-12.5 mg (HYZAAR) 50-12.5 mg per tablet  "No dose, route, or frequency recorded.    metFORMIN (GLUCOPHAGE-XR) 500 mg, Oral, With dinner    norgestimate-ethinyl estradioL (ORTHO-CYCLEN) 0.25-35 mg-mcg per tablet 1 tablet, Oral, Daily    prazosin (MINIPRESS) 1 mg    spironolactone (ALDACTONE) 100 mg    tramadol-acetaminophen 37.5-325 mg (ULTRACET) 37.5-325 mg Tab 1 tablet, Every 6 hours PRN    UBRELVY 100 mg, Daily PRN    WEGOVY 1.7 mg, Subcutaneous, Every 7 days     Past Surgical History:   Procedure Laterality Date     SECTION      WISDOM TOOTH EXTRACTION       Family History   Problem Relation Name Age of Onset    Thyroid disease Mother      Breast cancer Maternal Grandmother      Left Breast Cancer Maternal Grandmother      Diabetes Maternal Grandfather         Social History     Tobacco Use    Smoking status: Every Day     Types: Vaping w/o nicotine    Smokeless tobacco: Never    Tobacco comments:     Vapes Marijuana   Substance Use Topics    Alcohol use: Yes     Comment: socially    Drug use: Yes     Types: Marijuana     Comment: prescription marijuana        Health Maintenance      Health Maintenance   Topic Date Due    Hepatitis C Screening  Never done    Pneumococcal Vaccines (Age 0-64) (1 of 2 - PCV) 2007    TETANUS VACCINE  2022    Influenza Vaccine (1) 2024    COVID-19 Vaccine ( - - season) Never done    Chlamydia Screening  2025    Pap Smear  2026    RSV Vaccine (Age 60+ and Pregnant patients) (1 - 1-dose 75+ series) 2076    HIV Screening  Completed    Lipid Panel  Completed    HPV Vaccines  Completed       OBJECTIVE:     Physical Exam      Vital Signs Reviewed   Visit Vitals  /79 (BP Location: Left arm, Patient Position: Sitting)   Pulse 74   Temp 98.1 °F (36.7 °C) (Oral)   Resp 18   Ht 5' 4" (1.626 m)   Wt 85.5 kg (188 lb 6.4 oz)   LMP 2024   SpO2 97%   BMI 32.34 kg/m²       Physical Exam  Constitutional:       Appearance: Normal appearance.   Pulmonary:      Effort: Pulmonary " effort is normal.      Breath sounds: Normal breath sounds.   Abdominal:      General: Bowel sounds are normal.      Palpations: Abdomen is soft.   Musculoskeletal:      Lumbar back: Spasms and tenderness present.   Skin:     General: Skin is warm and dry.   Neurological:      Mental Status: She is alert and oriented to person, place, and time.         Physical Exam:  General: Alert, well nourished, no acute distress, non-toxic appearing.   Eyes: Anicteric sclera, without conjunctival injection, normal lids, no purulent drainage, EOMs grossly intact.   Ears: No tragal tenderness. Tympanic membranes intact, pearly grey, without effusion or erythema and with a positive light reflex.   Mouth: Posterior pharynx without erythema. No exudate, ulcerations, or lesion. No tonsillar swelling.   Neck: Supple, full ROM, no rigidity, no cervical adenopathy.   Cardio: Normal rate and rhythm    Resp: Respirations even and unlabored, clear to auscultation bilaterally.   Abd: No ecchymosis or distension. Normal bowel sounds in all 4 quadrants. No tenderness to palpation. No rebound tenderness or guarding. No CVA tenderness.   Skin: No rashes or open lesions noted.   Neuro: Alert,oriented No focal deficits noted. Facial expressions even.   Psych: Cooperative, Normal affect      Procedures    Procedures     Labs     Results for orders placed or performed in visit on 07/17/24   HIV 1/2 Ag/Ab (4th Gen)    Collection Time: 07/17/24  4:51 PM   Result Value Ref Range    HIV Nonreactive Nonreactive   SYPHILIS ANTIBODY (WITH REFLEX RPR)    Collection Time: 07/17/24  4:51 PM   Result Value Ref Range    Syphilis Antibody Nonreactive Nonreactive, Equivocal       Chemistry:  Lab Results   Component Value Date     07/08/2024    K 4.2 07/08/2024    BUN 11.2 07/08/2024    CREATININE 0.78 07/08/2024    EGFRNORACEVR >60 07/08/2024    GLUCOSE 102 (H) 07/08/2024    CALCIUM 9.8 07/08/2024    ALKPHOS 88 07/08/2024    LABPROT 8.1 07/08/2024     "ALBUMIN 3.8 07/08/2024    AST 17 07/08/2024    ALT 16 07/08/2024    [id number] 19 (L) 07/08/2024    TSH 1.241 07/08/2024        Lab Results   Component Value Date    HGBA1C 5.2 07/08/2024        Hematology:  Lab Results   Component Value Date    WBC 8.61 07/08/2024    HGB 13.3 07/08/2024    HCT 40.7 07/08/2024     07/08/2024       Lipid Panel:  Lab Results   Component Value Date    CHOL 254 (H) 07/08/2024    HDL 50 07/08/2024    .00 (H) 07/08/2024    TRIG 132 07/08/2024    TOTALCHOLEST 5 07/08/2024        Urine:  No results found for: "COLORUA", "APPEARANCEUA", "SGUA", "PHUA", "PROTEINUA", "GLUCOSEUA", "KETONESUA", "BLOODUA", "NITRITESUA", "LEUKOCYTESUR", "RBCUA", "WBCUA", "BACTERIA", "SQEPUA", "HYALINECASTS", "CREATRANDUR", "PROTEINURINE", "UPROTCREA"      Assessment            ICD-10-CM ICD-9-CM   1. Lumbar back pain  M54.50 724.2   2. Chronic right shoulder pain  M25.511 719.41    G89.29 338.29   3. PTSD (post-traumatic stress disorder)  F43.10 309.81   4. Hypertension, unspecified type  I10 401.9   5. Dorsalgia, unspecified  M54.9 724.5       Plan       1. Lumbar back pain  - Ambulatory referral/consult to Neurosurgery; Future  Last MRI dec 2023      2. Chronic right shoulder pain  Follow up with Orthopedic Dr. Roach  3. PTSD (post-traumatic stress disorder)  Continue routine appointment with mental health provider DAPHNIE Monte  4. Hypertension, unspecified type  Continue treatment per Cardiology  5. Dorsalgia, unspecified  - MRI Lumbar Spine Without Contrast; Future  - Ambulatory referral/consult to Neurosurgery; Future    Orders Placed This Encounter    MRI Lumbar Spine Without Contrast    Ambulatory referral/consult to Neurosurgery      Medication List with Changes/Refills   Current Medications    AMLODIPINE (NORVASC) 10 MG TABLET        BUPROPION (WELLBUTRIN XL) 150 MG TB24 TABLET    Take 150 mg by mouth every morning.    IBUPROFEN (ADVIL,MOTRIN) 600 MG TABLET    Take 600 mg by mouth daily as " needed.    LABETALOL (NORMODYNE) 200 MG TABLET    Take 1 tablet by mouth.    LIDOCAINE (LIDODERM) 5 %    Place 1 patch onto the skin once daily.    LOSARTAN-HYDROCHLOROTHIAZIDE 50-12.5 MG (HYZAAR) 50-12.5 MG PER TABLET        METFORMIN (GLUCOPHAGE-XR) 500 MG ER 24HR TABLET    Take 1 tablet (500 mg total) by mouth daily with dinner or evening meal.    NORGESTIMATE-ETHINYL ESTRADIOL (ORTHO-CYCLEN) 0.25-35 MG-MCG PER TABLET    Take 1 tablet by mouth once daily.    PRAZOSIN (MINIPRESS) 1 MG CAP    Take 1 mg by mouth.    SEMAGLUTIDE, WEIGHT LOSS, (WEGOVY) 1.7 MG/0.75 ML PNIJ    Inject 1.7 mg into the skin every 7 days.    SPIRONOLACTONE (ALDACTONE) 100 MG TABLET    Take 100 mg by mouth.    TRAMADOL-ACETAMINOPHEN 37.5-325 MG (ULTRACET) 37.5-325 MG TAB    Take 1 tablet by mouth every 6 (six) hours as needed.    UBROGEPANT (UBRELVY) 100 MG TABLET    Take 100 mg by mouth daily as needed.       No follow-ups on file.   No follow-ups on file. In addition to their scheduled follow up, the patient has also been instructed to follow up on as needed basis.   Future Appointments   Date Time Provider Department Center   12/26/2024  7:30 AM [facility] MRI1 650 LB LIMIT [facility]   3/19/2025  9:30 AM Nicholas Rivas FNP [facility]   7/17/2025  8:30 AM Nicholas Rivas FNP [facility]

## 2024-12-23 ENCOUNTER — TELEPHONE (OUTPATIENT)
Dept: NEUROSURGERY | Facility: CLINIC | Age: 24
End: 2024-12-23
Payer: COMMERCIAL

## 2024-12-30 ENCOUNTER — OFFICE VISIT (OUTPATIENT)
Dept: URGENT CARE | Facility: CLINIC | Age: 24
End: 2024-12-30
Payer: COMMERCIAL

## 2024-12-30 VITALS
DIASTOLIC BLOOD PRESSURE: 93 MMHG | WEIGHT: 180 LBS | TEMPERATURE: 102 F | OXYGEN SATURATION: 99 % | SYSTOLIC BLOOD PRESSURE: 143 MMHG | RESPIRATION RATE: 18 BRPM | HEART RATE: 105 BPM | BODY MASS INDEX: 30.73 KG/M2 | HEIGHT: 64 IN

## 2024-12-30 DIAGNOSIS — Z20.828 EXPOSURE TO THE FLU: Primary | ICD-10-CM

## 2024-12-30 DIAGNOSIS — R52 BODY ACHES: ICD-10-CM

## 2024-12-30 LAB
CTP QC/QA: YES
POC MOLECULAR INFLUENZA A AGN: POSITIVE
POC MOLECULAR INFLUENZA B AGN: NEGATIVE

## 2024-12-30 RX ORDER — AZELASTINE 1 MG/ML
1 SPRAY, METERED NASAL 2 TIMES DAILY
Qty: 30 ML | Refills: 0 | Status: SHIPPED | OUTPATIENT
Start: 2024-12-30 | End: 2025-12-30

## 2024-12-30 RX ORDER — OSELTAMIVIR PHOSPHATE 75 MG/1
75 CAPSULE ORAL 2 TIMES DAILY
Qty: 10 CAPSULE | Refills: 0 | Status: SHIPPED | OUTPATIENT
Start: 2024-12-30 | End: 2024-12-30 | Stop reason: SDDI

## 2024-12-30 RX ORDER — ACETAMINOPHEN 500 MG
500 TABLET ORAL
Status: COMPLETED | OUTPATIENT
Start: 2024-12-30 | End: 2024-12-30

## 2024-12-30 RX ORDER — ARIPIPRAZOLE 5 MG/1
TABLET ORAL
COMMUNITY
Start: 2024-10-21

## 2024-12-30 RX ORDER — OSELTAMIVIR PHOSPHATE 75 MG/1
75 CAPSULE ORAL 2 TIMES DAILY
Qty: 10 CAPSULE | Refills: 0 | Status: SHIPPED | OUTPATIENT
Start: 2024-12-30 | End: 2024-12-30 | Stop reason: ALTCHOICE

## 2024-12-30 RX ADMIN — Medication 500 MG: at 06:12

## 2024-12-30 NOTE — PROGRESS NOTES
"Subjective:      Patient ID: Karol Flanagan is a 24 y.o. female.    Vitals:  height is 5' 4" (1.626 m) and weight is 81.6 kg (180 lb). Her tympanic temperature is 102.1 °F (38.9 °C) (abnormal). Her blood pressure is 143/93 (abnormal) and her pulse is 105. Her respiration is 18 and oxygen saturation is 99%.     Chief Complaint: Generalized Body Aches     Patient is a 24 y.o. female who presents to urgent care with complaints of headache, generalized body aches, fatigue, deep cough, chills x 1 days. Patient denies shortness of breathe. Patient states exposure to Flu A in household.   Patient denies c pain, sob, jones.  Nkda.      Constitution: Positive for chills and fever.   HENT:  Positive for congestion and sore throat. Negative for ear discharge, drooling, facial swelling and trouble swallowing.    Neck: Negative for neck pain and neck stiffness.   Cardiovascular:  Negative for chest pain.   Respiratory:  Positive for cough. Negative for bloody sputum, shortness of breath and wheezing.    Gastrointestinal:  Negative for abdominal pain, vomiting and diarrhea.   Skin:  Negative for rash.      Objective:        Previous History      Review of patient's allergies indicates:  No Known Allergies    Past Medical History:   Diagnosis Date    Genetic testing     MyRisk Negative, Remaining Lifitime Risk 11.6%    GERD (gastroesophageal reflux disease)     Headache due to hypertension     Heart disease     Hx of migraine headaches     Hypertension     Insulin resistance     PCOS (polycystic ovarian syndrome)     PTSD (post-traumatic stress disorder)      Current Outpatient Medications   Medication Instructions    amLODIPine (NORVASC) 10 MG tablet     ARIPiprazole (ABILIFY) 5 MG Tab TAKE 1 TABLET BY MOUTH DAILY IN THE MORNING    azelastine (ASTELIN) 137 mcg, Nasal, 2 times daily    buPROPion (WELLBUTRIN XL) 150 mg, Every morning    ibuprofen (ADVIL,MOTRIN) 600 mg, Daily PRN    labetaloL (NORMODYNE) 200 MG tablet 1 tablet    " "LIDOcaine (LIDODERM) 5 % 1 patch, Daily    losartan-hydrochlorothiazide 50-12.5 mg (HYZAAR) 50-12.5 mg per tablet No dose, route, or frequency recorded.    metFORMIN (GLUCOPHAGE-XR) 500 mg, Oral, With dinner    norgestimate-ethinyl estradioL (ORTHO-CYCLEN) 0.25-35 mg-mcg per tablet 1 tablet, Oral, Daily    prazosin (MINIPRESS) 1 mg    pyrilamine-chlophedianoL 12.5-12.5 mg/5 mL Liqd 10 mLs, Oral, 3 times daily    spironolactone (ALDACTONE) 100 mg    tramadol-acetaminophen 37.5-325 mg (ULTRACET) 37.5-325 mg Tab 1 tablet, Every 6 hours PRN    UBRELVY 100 mg, Daily PRN    WEGOVY 1.7 mg, Subcutaneous, Every 7 days     Past Surgical History:   Procedure Laterality Date     SECTION      WISDOM TOOTH EXTRACTION       Family History   Problem Relation Name Age of Onset    Thyroid disease Mother      Breast cancer Maternal Grandmother      Left Breast Cancer Maternal Grandmother      Diabetes Maternal Grandfather         Social History     Tobacco Use    Smoking status: Every Day     Types: Vaping w/o nicotine    Smokeless tobacco: Never    Tobacco comments:     Vapes Marijuana   Substance Use Topics    Alcohol use: Yes     Comment: socially    Drug use: Yes     Types: Marijuana     Comment: prescription marijuana        Physical Exam      Vital Signs Reviewed   BP (!) 143/93   Pulse 105   Temp (!) 102.1 °F (38.9 °C) (Tympanic)   Resp 18   Ht 5' 4" (1.626 m)   Wt 81.6 kg (180 lb)   LMP 2024 (Approximate)   SpO2 99%   BMI 30.90 kg/m²        Procedures    Procedures     Labs     Results for orders placed or performed in visit on 24   POCT Influenza A/B Molecular    Collection Time: 24  6:25 PM   Result Value Ref Range    POC Molecular Influenza A Ag Positive (A) Negative    POC Molecular Influenza B Ag Negative (A) Negative     Acceptable Yes        Physical Exam   Constitutional: She appears well-developed.  Non-toxic appearance. She does not appear ill. No distress.   HENT: "   Head: Atraumatic.   Ears:   Right Ear: Tympanic membrane, external ear and ear canal normal.   Left Ear: Tympanic membrane, external ear and ear canal normal.   Nose: No purulent discharge. Right sinus exhibits no maxillary sinus tenderness and no frontal sinus tenderness. Left sinus exhibits no maxillary sinus tenderness and no frontal sinus tenderness.   Mouth/Throat: Uvula is midline. Mucous membranes are moist. Posterior oropharyngeal erythema present.   Eyes: Right eye exhibits no discharge. Left eye exhibits no discharge. Extraocular movement intact   Neck: Neck supple. No neck rigidity present.   Cardiovascular: Regular rhythm, normal heart sounds and normal pulses. Tachycardia present.   Pulmonary/Chest: Effort normal and breath sounds normal. No respiratory distress. She has no wheezes. She has no rhonchi. She has no rales.   Lymphadenopathy:     She has no cervical adenopathy.   Neurological: She is alert.   Skin: Skin is warm, dry and not diaphoretic.   Psychiatric: Her behavior is normal.   Nursing note and vitals reviewed.      Assessment:     1. Exposure to the flu    2. Body aches        Plan:   Cough  bODY aCHES        Flu test:  YOU HAVE TESTED POSITIVE FOR FLU TYPE A.      Medication will be sent to pharmacy.  You will be givenXOFLUZA 1 DOSE PILL    Drink plenty of fluids.     Get plenty of rest.     Make sure to wash hands.  Limit contacts.    It is advised that you stay home from work for 3 days.    Tylenol or Motrin as needed for fever or pain.    Go to the ER with any significant change or worsening of symptoms.     Exposure to the flu    Body aches  -     POCT Influenza A/B Molecular  -     acetaminophen tablet 500 mg    Other orders  -     Discontinue: oseltamivir (TAMIFLU) 75 MG capsule; Take 1 capsule (75 mg total) by mouth 2 (two) times daily. for 5 days  Dispense: 10 capsule; Refill: 0  -     azelastine (ASTELIN) 137 mcg (0.1 %) nasal spray; 1 spray (137 mcg total) by Nasal route 2  (two) times daily.  Dispense: 30 mL; Refill: 0  -     pyrilamine-chlophedianoL 12.5-12.5 mg/5 mL Liqd; Take 10 mLs by mouth 3 (three) times daily.  Dispense: 180 mL; Refill: 0  -     Discontinue: oseltamivir (TAMIFLU) 75 MG capsule; Take 1 capsule (75 mg total) by mouth 2 (two) times daily. for 5 days  Dispense: 10 capsule; Refill: 0

## 2024-12-31 NOTE — PATIENT INSTRUCTIONS
Cough        Flu test:  YOU HAVE TESTED POSITIVE FOR FLU TYPE A.      Medication will be sent to pharmacy.  You will be givenXOFLUZA 1 DOSE PILL    Drink plenty of fluids.     Get plenty of rest.     Make sure to wash hands.  Limit contacts.    It is advised that you stay home from work for 3 days.    Tylenol or Motrin as needed for fever or pain.    Go to the ER with any significant change or worsening of symptoms.     Follow up with your primary care doctor.

## 2025-01-23 NOTE — PROGRESS NOTES
[facility]  History & Physical  Neurosurgery      Karol Flanagan   [id number]   2000       SUBJECTIVE:     CHIEF COMPLAINT:  Lower back pain radiating into the bilateral lower extremities with numbness and weakness, neck tightness with intermittent shooting down the left arm into the ring and middle fingers    HPI:  Karol Flanagan is a 24 y.o. female who presents for neurosurgical evaluation at the recommendation of HONG Mathew. The patient presents today describing chronic lower back pain dating back to 2020 when she was in the .  The patient contributes the onset of symptoms to caring a heavy pack during strenuous activity.  The patient states since that time her symptoms have continued to progress now radiating into the bilateral lower extremities with numbness and tingling as well as intermittent weakness into lower extremities.  The patient states with any type of prolonged standing, sitting, walking, bending and lifting her lower back pain will be aggravated.  She states she tosses and turns at night secondary to lower back stiffness.  She has attempted physical therapy in the past which unfortunately only aggravated her symptoms.  She did participate in 1 session of chiropractic care which provided him temporary relief.  She does mention she felt improvement of symptoms with stretching and traction therapy.  She also has been struggling neck tightness and pulling with intermittent shooting and shocking pain down the left arm into the left ring and long fingers.  She is denying any upper extreme this at this time.  She states she has notice if she turns her neck a certain way it will cause shooting pain into the left arm and hand.  The patient rates the pain 6 on the pain scale. The patient denies disturbances in bowel or bladder function.  There is no difficulty with balance.  She reports despite activity modification, home exercises as well as meloxicam for greater than 6 weeks  and last 6 months her symptoms have persisted.  She had attempted to obtain an MRI of the lumbar spine although had to abort these images secondary to claustrophobia.  She reports she also has been struggling from daily headaches into the frontal region bilaterally.  She does note some mild light sensitivity with her headaches as well. The patient denies vision changes, memory loss, difficulties with speech or swallowing, dizziness, difficulties with sleep or hearing changes.      Past Medical History:   Diagnosis Date    Genetic testing     MyRisk Negative, Remaining Lifitime Risk 11.6%    GERD (gastroesophageal reflux disease)     Headache due to hypertension     Heart disease     Hx of migraine headaches     Hypertension     Insulin resistance     PCOS (polycystic ovarian syndrome)     PTSD (post-traumatic stress disorder)        Past Surgical History:   Procedure Laterality Date     SECTION      WISDOM TOOTH EXTRACTION         Family History   Problem Relation Name Age of Onset    Thyroid disease Mother      Breast cancer Maternal Grandmother      Left Breast Cancer Maternal Grandmother      Diabetes Maternal Grandfather         Social History     Socioeconomic History    Marital status:    Tobacco Use    Smoking status: Every Day     Types: Vaping w/o nicotine    Smokeless tobacco: Never    Tobacco comments:     Vapes Marijuana   Substance and Sexual Activity    Alcohol use: Yes     Comment: socially    Drug use: Yes     Types: Marijuana     Comment: prescription marijuana    Sexual activity: Yes     Partners: Male     Birth control/protection: None, Condom        Review of patient's allergies indicates:  No Known Allergies     Current Outpatient Medications   Medication Instructions    amLODIPine (NORVASC) 10 MG tablet     ARIPiprazole (ABILIFY) 5 MG Tab TAKE 1 TABLET BY MOUTH DAILY IN THE MORNING    azelastine (ASTELIN) 137 mcg, Nasal, 2 times daily    buPROPion (WELLBUTRIN XL) 150 mg, Every  morning    diazePAM (VALIUM) 10 mg, Oral, Once as needed    ibuprofen (ADVIL,MOTRIN) 600 mg, Daily PRN    labetaloL (NORMODYNE) 200 MG tablet 1 tablet    LIDOcaine (LIDODERM) 5 % 1 patch, Daily    losartan-hydrochlorothiazide 50-12.5 mg (HYZAAR) 50-12.5 mg per tablet No dose, route, or frequency recorded.    metFORMIN (GLUCOPHAGE-XR) 500 mg, Oral, With dinner    norgestimate-ethinyl estradioL (ORTHO-CYCLEN) 0.25-35 mg-mcg per tablet 1 tablet, Oral, Daily    prazosin (MINIPRESS) 1 mg    pyrilamine-chlophedianoL 12.5-12.5 mg/5 mL Liqd 10 mLs, Oral, 3 times daily    spironolactone (ALDACTONE) 25 mg    tramadol-acetaminophen 37.5-325 mg (ULTRACET) 37.5-325 mg Tab 1 tablet, Every 6 hours PRN    traZODone (DESYREL) 50 mg, Nightly    WEGOVY 1.7 mg, Subcutaneous, Every 7 days    XOFLUZA 80 mg tablet 1 tablet, Once          Review of Systems   Constitutional:  Negative for chills, fever and weight loss.   HENT:  Negative for congestion, hearing loss, nosebleeds and tinnitus.    Eyes:  Negative for blurred vision, double vision and photophobia.   Respiratory:  Negative for cough, shortness of breath and wheezing.    Cardiovascular:  Negative for chest pain, palpitations and leg swelling.   Gastrointestinal:  Negative for constipation, diarrhea, nausea and vomiting.   Genitourinary:  Negative for dysuria, frequency and urgency.   Musculoskeletal:  Positive for back pain and neck pain. Negative for falls.   Skin:  Negative for itching and rash.   Neurological:  Positive for tingling (bilateral lower extremities, intermittent shocking and tingling to the left upper extremity and hand) and weakness (bilateral lower extremities intermittently). Negative for dizziness, tremors, sensory change, speech change, seizures, loss of consciousness and headaches.   Psychiatric/Behavioral:  Negative for depression, hallucinations and memory loss. The patient is not nervous/anxious.        OBJECTIVE:     Visit Vitals  BP (!) 145/95 (BP  "Location: Left arm, Patient Position: Sitting)   Pulse 68   Resp 16   Ht 5' 4" (1.626 m)   Wt 86.6 kg (191 lb)   LMP 12/23/2024 (Approximate)   BMI 32.79 kg/m²    The patient was advised to recheck her blood pressure later in the day.  Should it remain elevated she was advised to notify her primary care provider.    Physical Exam    General:  Pleasant, Well-nourished, Well-groomed.    Cardiovascular:  Neck is supple    Lungs:  Breathing is quiet, non-lablored    Abdomen:  Soft, non-tender, non-distended.    Neurological:  Oriented to Person, Place, Time   Speech: normal  Memory, cognition, and affect are appropriate.  Pupils are equal, round, and reactive to light.  Extraocular movements are intact.  Movements of facial expression are intact and symmetric  Muscle strength against resistance:   Right Left   Deltoid (C5) 5/5 5/5   Biceps (C5/6) 5/5 5/5   Wrist Flexors (C5/6) 5/5 5/5   Triceps (C7) 5/5 5/5   Wrist extension (C7) 5/5 5/5   Finger abduction (C8) 5/5 5/5    5/5 5/5        Hip abduction 5/5 5/5   Hip adduction 5/5 5/5   Hip flexion (L2) 5/5 5/5   Knee extension (L3) 5/5 5/5   Knee flexion (L4) 5/5 5/5   Dorsiflexion (L5) 5/5 5/5   EHL (L5) 5/5 5/5   Plantar flexion (S1) 5/5 5/5   Sensation is intact to primary modalities in bilateral upper and lower extremities.    Reflexes:   Right Left   Triceps (C7) 2+ 2+   Biceps (C5) 2+ 2+   Brachioradialis (C6) 2+ 2+   Patellar (L4) 2+ 2+   Achilles (S1) 2+ 2+   Negative Clonus, Maxwell, Tinel's, and Phalen's bilaterally.  Gait is normal  Coordination is normal.  No tremor noted.    Imaging:  All pertinent neuroimaging independently reviewed. Discussed these findings in detail with the patient.    Previous x-rays of the lumbar spine dated 12/22/2023 reveal partial lumbarization on the right at S1 with mild sclerosis to the bilateral SI joints.  This is purely from report.  These images are unavailable for my review today.    ASSESSMENT:       ICD-10-CM ICD-9-CM "   1. Lumbar radiculopathy  M54.16 724.4   2. Lumbar back pain  M54.50 724.2   3. Cervical radiculopathy  M54.12 723.4   4. Cervicalgia  M54.2 723.1   5. Claustrophobia  F40.240 300.29     PLAN:     1. Lumbar radiculopathy (Primary)  - Ambulatory referral/consult to Neurosurgery  - X-Ray Lumbar Spine Ap Lateral w/Flex Ext; Future    2. Lumbar back pain  - Ambulatory referral/consult to Neurosurgery  - X-Ray Lumbar Spine Ap Lateral w/Flex Ext; Future    3. Cervical radiculopathy  - X-Ray Cervical Spine 5 View W Flex Extxt; Future  - MRI Cervical Spine Without Contrast; Future    4. Cervicalgia  - MRI Cervical Spine Without Contrast; Future    5. Claustrophobia  - diazePAM (VALIUM) 10 MG Tab; Take 1 tablet (10 mg total) by mouth 1 (one) time if needed (Take once 30 minutes prior to procedure. May repeat once if needed).  Dispense: 2 tablet; Refill: 0    Karol Flanagan presents today describing chronic and progressive lower back pain radiating into the bilateral lower extremities with numbness and intermittent weakness.  She also has experienced ongoing neck pain and stiffness with intermittent shooting pain and numbness into the left upper extremity.  I did take the time to discuss her previous x-rays of the lumbar spine with her in clinic today.  At this time I would like to move forward with a cervical MRI as well as x-rays of the cervical spine and lumbar spine.  We will move forward with the previously ordered MRI of the lumbar spine as well.  I have provided the patient has some Valium to hopefully ease her claustrophobia to allow her to tolerate these diagnostic studies.  We did discuss should she continue to have difficulty we could potentially pursue MRI imaging with IV sedation.  She verbalizes understanding at this time.  I will contact the patient once these images are available for my review.  We discussed likely moving forward with referral with pain management to discuss possible epidural steroid  injections.  She also was provided information on over the door cervical traction devices today.  I did state this could help not only with her cervical symptoms would also possibly her ongoing headaches.  She was encouraged to have her vision screened as she states it has been sometime since this was done.  She was advised to notify us with any concerns or questions prior to further consultation.    No follow-ups on file.      E/M Level Based On Time:   15 minutes spent on reviewing chart, which includes interpreting lab results and diagnostic tests.   25 minutes spent in the room with the patient performing a history and physical exam, counseling or educating the patient/caregiver, prescribing medications, ordering labwork/diagnostic tests, or placing referrals.   0 minutes spent collaborating plan of care with physician.   5 minutes spent documenting all relevant clinical informationin the electronic health record.     Total Time Spent: 45 minutes       HONG Peters    Disclaimer:  This note is prepared using voice recognition software and as such is likely to have errors despite attempts at proofreading. Please contact me for questions.

## 2025-01-28 RX ORDER — TRAZODONE HYDROCHLORIDE 50 MG/1
50 TABLET ORAL NIGHTLY
COMMUNITY
Start: 2024-10-21

## 2025-01-28 RX ORDER — SPIRONOLACTONE 25 MG/1
25 TABLET ORAL
COMMUNITY
Start: 2024-12-06

## 2025-01-29 ENCOUNTER — OFFICE VISIT (OUTPATIENT)
Dept: NEUROSURGERY | Facility: CLINIC | Age: 25
End: 2025-01-29
Payer: COMMERCIAL

## 2025-01-29 ENCOUNTER — PATIENT MESSAGE (OUTPATIENT)
Dept: NEUROSURGERY | Facility: CLINIC | Age: 25
End: 2025-01-29

## 2025-01-29 VITALS
DIASTOLIC BLOOD PRESSURE: 95 MMHG | BODY MASS INDEX: 32.61 KG/M2 | HEART RATE: 68 BPM | WEIGHT: 191 LBS | RESPIRATION RATE: 16 BRPM | HEIGHT: 64 IN | SYSTOLIC BLOOD PRESSURE: 145 MMHG

## 2025-01-29 DIAGNOSIS — M54.16 LUMBAR RADICULOPATHY: Primary | ICD-10-CM

## 2025-01-29 DIAGNOSIS — F40.240 CLAUSTROPHOBIA: ICD-10-CM

## 2025-01-29 DIAGNOSIS — M54.2 CERVICALGIA: ICD-10-CM

## 2025-01-29 DIAGNOSIS — M54.50 LUMBAR BACK PAIN: ICD-10-CM

## 2025-01-29 DIAGNOSIS — M54.12 CERVICAL RADICULOPATHY: ICD-10-CM

## 2025-01-29 PROCEDURE — 99204 OFFICE O/P NEW MOD 45 MIN: CPT | Mod: ,,, | Performed by: NURSE PRACTITIONER

## 2025-01-29 PROCEDURE — 3008F BODY MASS INDEX DOCD: CPT | Mod: CPTII,,, | Performed by: NURSE PRACTITIONER

## 2025-01-29 PROCEDURE — 1160F RVW MEDS BY RX/DR IN RCRD: CPT | Mod: CPTII,,, | Performed by: NURSE PRACTITIONER

## 2025-01-29 PROCEDURE — 1159F MED LIST DOCD IN RCRD: CPT | Mod: CPTII,,, | Performed by: NURSE PRACTITIONER

## 2025-01-29 PROCEDURE — 3080F DIAST BP >= 90 MM HG: CPT | Mod: CPTII,,, | Performed by: NURSE PRACTITIONER

## 2025-01-29 PROCEDURE — 3075F SYST BP GE 130 - 139MM HG: CPT | Mod: CPTII,,, | Performed by: NURSE PRACTITIONER

## 2025-01-29 RX ORDER — DIAZEPAM 10 MG/1
10 TABLET ORAL ONCE AS NEEDED
Qty: 2 TABLET | Refills: 0 | Status: SHIPPED | OUTPATIENT
Start: 2025-01-29

## 2025-01-29 RX ORDER — BALOXAVIR MARBOXIL 80 MG/1
1 TABLET, FILM COATED ORAL ONCE
COMMUNITY
Start: 2024-12-31

## 2025-02-06 ENCOUNTER — HOSPITAL ENCOUNTER (OUTPATIENT)
Dept: RADIOLOGY | Facility: HOSPITAL | Age: 25
Discharge: HOME | End: 2025-02-06
Attending: NURSE PRACTITIONER
Payer: COMMERCIAL

## 2025-02-06 ENCOUNTER — PATIENT MESSAGE (OUTPATIENT)
Dept: NEUROSURGERY | Facility: CLINIC | Age: 25
End: 2025-02-06
Payer: COMMERCIAL

## 2025-02-06 ENCOUNTER — HOSPITAL ENCOUNTER (OUTPATIENT)
Dept: RADIOLOGY | Facility: HOSPITAL | Age: 25
Discharge: HOME | End: 2025-02-06
Attending: ORTHOPAEDIC SURGERY
Payer: COMMERCIAL

## 2025-02-06 ENCOUNTER — PATIENT MESSAGE (OUTPATIENT)
Dept: FAMILY MEDICINE | Facility: CLINIC | Age: 25
End: 2025-02-06
Payer: COMMERCIAL

## 2025-02-06 ENCOUNTER — PATIENT MESSAGE (OUTPATIENT)
Dept: ORTHOPEDICS | Facility: CLINIC | Age: 25
End: 2025-02-06
Payer: COMMERCIAL

## 2025-02-06 DIAGNOSIS — M54.9 DORSALGIA, UNSPECIFIED: ICD-10-CM

## 2025-02-06 DIAGNOSIS — M54.2 CERVICALGIA: ICD-10-CM

## 2025-02-06 DIAGNOSIS — M54.16 LUMBAR RADICULOPATHY: ICD-10-CM

## 2025-02-06 DIAGNOSIS — M54.12 CERVICAL RADICULOPATHY: ICD-10-CM

## 2025-02-06 DIAGNOSIS — M54.50 LUMBAR BACK PAIN: ICD-10-CM

## 2025-02-06 PROCEDURE — 72141 MRI NECK SPINE W/O DYE: CPT | Mod: TC

## 2025-02-06 PROCEDURE — 72110 X-RAY EXAM L-2 SPINE 4/>VWS: CPT | Mod: TC

## 2025-02-06 PROCEDURE — 72148 MRI LUMBAR SPINE W/O DYE: CPT | Mod: TC

## 2025-02-06 PROCEDURE — 72052 X-RAY EXAM NECK SPINE 6/>VWS: CPT | Mod: TC

## 2025-02-06 PROCEDURE — 73221 MRI JOINT UPR EXTREM W/O DYE: CPT | Mod: TC,LT

## 2025-02-10 ENCOUNTER — TELEPHONE (OUTPATIENT)
Dept: NEUROSURGERY | Facility: CLINIC | Age: 25
End: 2025-02-10
Payer: COMMERCIAL

## 2025-02-10 DIAGNOSIS — M54.2 CERVICALGIA: ICD-10-CM

## 2025-02-10 DIAGNOSIS — M46.1 SACROILIITIS: Primary | ICD-10-CM

## 2025-02-10 NOTE — TELEPHONE ENCOUNTER
Spoke with patient regarding her recent MRI and x-ray findings of the MRI and lumbar spine.  We will refer the patient on for consultation regarding conservative management at this time as I am not seeing any surgical target.  We will plan to see the patient back in clinic on an as-needed basis going forward.  This plan was discussed with the patient and she is in agreement to move forward.

## 2025-02-10 NOTE — TELEPHONE ENCOUNTER
----- Message from Med Assistant Betzy sent at 2/6/2025  2:55 PM CST -----  Please let me know if/how you'd like for me to respond. Thank you!     DISPLAY PLAN FREE TEXT

## 2025-02-26 ENCOUNTER — TELEPHONE (OUTPATIENT)
Facility: CLINIC | Age: 25
End: 2025-02-26
Payer: COMMERCIAL

## 2025-02-26 NOTE — TELEPHONE ENCOUNTER
Pt contacted office stating she was told when she was scheduled that Alban Chapman NP doesn't prescribe pain medication and was asking who does. I advised pt that neither Dr Landaverde or Ms live prescribes Narcotics, pt said thank you & hung up

## 2025-03-19 ENCOUNTER — OFFICE VISIT (OUTPATIENT)
Dept: FAMILY MEDICINE | Facility: CLINIC | Age: 25
End: 2025-03-19
Payer: COMMERCIAL

## 2025-03-19 VITALS
OXYGEN SATURATION: 99 % | SYSTOLIC BLOOD PRESSURE: 120 MMHG | TEMPERATURE: 98 F | HEART RATE: 63 BPM | RESPIRATION RATE: 16 BRPM | BODY MASS INDEX: 33.38 KG/M2 | WEIGHT: 195.5 LBS | HEIGHT: 64 IN | DIASTOLIC BLOOD PRESSURE: 78 MMHG

## 2025-03-19 DIAGNOSIS — Z00.00 WELLNESS EXAMINATION: ICD-10-CM

## 2025-03-19 DIAGNOSIS — M54.50 LUMBAR BACK PAIN: Primary | ICD-10-CM

## 2025-03-19 DIAGNOSIS — M54.12 CERVICAL RADICULOPATHY: ICD-10-CM

## 2025-03-19 DIAGNOSIS — M46.1 SACROILIITIS: ICD-10-CM

## 2025-03-19 DIAGNOSIS — I10 HYPERTENSION, UNSPECIFIED TYPE: ICD-10-CM

## 2025-03-19 DIAGNOSIS — F43.10 PTSD (POST-TRAUMATIC STRESS DISORDER): ICD-10-CM

## 2025-03-19 DIAGNOSIS — M54.16 LUMBAR RADICULOPATHY: Primary | ICD-10-CM

## 2025-03-19 NOTE — ASSESSMENT & PLAN NOTE
Improved with meds Abilify and Prazosin per  Mental health provider Mrs. Monte    Denies SI, HI hallucinations.   Establish good family/social support, reading, meditation, physical activity exercise.  Avoid/limit caffeine, alcohol and stimulants.  Practice positive phrases  and relaxation techniques.  Set healthy boundaries, avoid people and conversations that increase stress.  ED precautions discussed

## 2025-03-19 NOTE — ASSESSMENT & PLAN NOTE
Controlled with medication.   Continue current medication as prescribed   Low salt/ DASH diet   Discussed lifestyle modifications.   encourage aerobic excise at least 30 mins a day   Monitor BP daily goal less than 140/90.   Denies headaches, blurred vision, or dizziness

## 2025-03-19 NOTE — PROGRESS NOTES
SUBJECTIVE:     History of Present Illness      Chief Complaint: Follow-up (3 month follow up visit. Completed PT for lumbar back pain.  Patient now walking at home to manage back pain.  No complaints at this time.)    HPI:  Patient is a 24 y.o. year old female who presents to clinic for for three-month follow-up patient completed physical therapy for lumbar pain.  She was evaluated by neurosurgeon with MRI evaluation for cervical chronic back pain and Xray of lumbar back.  She also has been struggling neck tightness and pulling with intermittent shooting and shocking pain down the left arm into the left ring and long fingers.  Referral was discussed  between pt and neurosurgical team to start pain management to discuss possible epidural steroid injections. Pt says she will attempt to reschedule PM appointment     Review of Systems:    Review of Systems    12 point review of systems conducted, negative except as stated in the history of present illness. See HPI for details.     Previous History      PCP: Nicholas Rivas FNP  Review of patient's allergies indicates:  No Known Allergies    Past Medical History:   Diagnosis Date    Depression     Genetic testing     MyRisk Negative, Remaining Lifitime Risk 11.6%    GERD (gastroesophageal reflux disease)     Headache due to hypertension     Heart disease     Hx of migraine headaches     Hypertension     Insulin resistance     PCOS (polycystic ovarian syndrome)     PTSD (post-traumatic stress disorder)        Past Surgical History:   Procedure Laterality Date     SECTION      WISDOM TOOTH EXTRACTION       Family History   Problem Relation Name Age of Onset    Thyroid disease Mother      Breast cancer Maternal Grandmother      Left Breast Cancer Maternal Grandmother      Diabetes Maternal Grandfather         Social History[1]     Health Maintenance      Health Maintenance   Topic Date Due    Hepatitis C Screening  Never done    Pneumococcal Vaccines (Age 0-49)  "(1 of 2 - PCV) 08/07/2020    TETANUS VACCINE  11/07/2022    Influenza Vaccine (1) 09/01/2024    COVID-19 Vaccine (1 - 2024-25 season) Never done    Chlamydia Screening  07/11/2025    Pap Smear  08/29/2026    RSV Vaccine (Age 60+ and Pregnant patients) (1 - 1-dose 75+ series) 08/07/2076    HIV Screening  Completed    Lipid Panel  Completed    HPV Vaccines  Completed       OBJECTIVE:     Physical Exam      Vital Signs Reviewed   Visit Vitals  /78 (BP Location: Left arm, Patient Position: Sitting)   Pulse 63   Temp 98.1 °F (36.7 °C) (Oral)   Resp 16   Ht 5' 4" (1.626 m)   Wt 88.7 kg (195 lb 8 oz)   LMP 03/13/2025 (Exact Date)   SpO2 99%   BMI 33.56 kg/m²       Physical Exam    Physical Exam:  General: Alert, well nourished, no acute distress, non-toxic appearing.   Eyes: Anicteric sclera, without conjunctival injection, normal lids, no purulent drainage, EOMs grossly intact.   Ears: No tragal tenderness. Tympanic membranes intact, pearly grey, without effusion or erythema and with a positive light reflex.   Mouth: Posterior pharynx without erythema. No exudate, ulcerations, or lesion. No tonsillar swelling.   Neck: Supple, full ROM, no rigidity, no cervical adenopathy.   Cardio: Normal rate and rhythm    Resp: Respirations even and unlabored, clear to auscultation bilaterally.   Abd: No ecchymosis or distension. Normal bowel sounds in all 4 quadrants. No tenderness to palpation. No rebound tenderness or guarding. No CVA tenderness.   Skin: No rashes or open lesions noted.   MSK: No swelling. No abrasions or signs of trauma. Ambulating without assistance.   Neuro: Alert,oriented No focal deficits noted. Facial expressions even.   Psych: Cooperative, Normal affect      Labs   Chemistry:  Lab Results   Component Value Date     07/08/2024    K 4.2 07/08/2024    BUN 11.2 07/08/2024    CREATININE 0.78 07/08/2024    EGFRNORACEVR >60 07/08/2024    GLUCOSE 102 (H) 07/08/2024    CALCIUM 9.8 07/08/2024    ALKPHOS 88 " "07/08/2024    LABPROT 8.1 07/08/2024    ALBUMIN 3.8 07/08/2024    AST 17 07/08/2024    ALT 16 07/08/2024    HTSIEHFF16GZ 19 (L) 07/08/2024    TSH 1.241 07/08/2024        Lab Results   Component Value Date    HGBA1C 5.2 07/08/2024        Hematology:  Lab Results   Component Value Date    WBC 8.61 07/08/2024    HGB 13.3 07/08/2024    HCT 40.7 07/08/2024     07/08/2024       Lipid Panel:  Lab Results   Component Value Date    CHOL 254 (H) 07/08/2024    HDL 50 07/08/2024    .00 (H) 07/08/2024    TRIG 132 07/08/2024    TOTALCHOLEST 5 07/08/2024        Urine:  No results found for: "COLORUA", "APPEARANCEUA", "SGUA", "PHUA", "PROTEINUA", "GLUCOSEUA", "KETONESUA", "BLOODUA", "NITRITESUA", "LEUKOCYTESUR", "RBCUA", "WBCUA", "BACTERIA", "SQEPUA", "HYALINECASTS", "CREATRANDUR", "PROTEINURINE", "UPROTCREA"      Assessment         ICD-10-CM ICD-9-CM   1. Lumbar back pain  M54.50 724.2   2. Cervical radiculopathy  M54.12 723.4   3. PTSD (post-traumatic stress disorder)  F43.10 309.81   4. Hypertension, unspecified type  I10 401.9       Plan       Assessment & Plan  Lumbar back pain  Improved with PT   Recommend proceed with PM evaluation     Cervical radiculopathy  Recommend proceed with PM evaluation        PTSD (post-traumatic stress disorder)  Improved with meds Abilify and Prazosin per  Mental health provider Mrs. Monte    Denies SI, HI hallucinations.   Establish good family/social support, reading, meditation, physical activity exercise.  Avoid/limit caffeine, alcohol and stimulants.  Practice positive phrases  and relaxation techniques.  Set healthy boundaries, avoid people and conversations that increase stress.  ED precautions discussed      Hypertension, unspecified type  Controlled with medication.   Continue current medication as prescribed   Low salt/ DASH diet   Discussed lifestyle modifications.   encourage aerobic excise at least 30 mins a day   Monitor BP daily goal less than 140/90.   Denies " headaches, blurred vision, or dizziness         Wellness examination  Prior to future appointment     Orders:    CBC Auto Differential; Future    Comprehensive Metabolic Panel; Future    Lipid Panel; Future    TSH; Future    Hemoglobin A1C; Future    Urinalysis; Future    Vitamin D; Future           Medication List with Changes/Refills   Current Medications    AMLODIPINE (NORVASC) 10 MG TABLET        ARIPIPRAZOLE (ABILIFY) 5 MG TAB    TAKE 1 TABLET BY MOUTH DAILY IN THE MORNING    AZELASTINE (ASTELIN) 137 MCG (0.1 %) NASAL SPRAY    1 spray (137 mcg total) by Nasal route 2 (two) times daily.    IBUPROFEN (ADVIL,MOTRIN) 600 MG TABLET    Take 600 mg by mouth daily as needed.    LABETALOL (NORMODYNE) 200 MG TABLET    Take 1 tablet by mouth.    LIDOCAINE (LIDODERM) 5 %    Place 1 patch onto the skin once daily.    LOSARTAN-HYDROCHLOROTHIAZIDE 50-12.5 MG (HYZAAR) 50-12.5 MG PER TABLET        METFORMIN (GLUCOPHAGE-XR) 500 MG ER 24HR TABLET    Take 1 tablet (500 mg total) by mouth daily with dinner or evening meal.    NORGESTIMATE-ETHINYL ESTRADIOL (ORTHO-CYCLEN) 0.25-35 MG-MCG PER TABLET    Take 1 tablet by mouth once daily.    PRAZOSIN (MINIPRESS) 1 MG CAP    Take 1 mg by mouth.    PYRILAMINE-CHLOPHEDIANOL 12.5-12.5 MG/5 ML LIQD    Take 10 mLs by mouth 3 (three) times daily.    SEMAGLUTIDE, WEIGHT LOSS, (WEGOVY) 1.7 MG/0.75 ML PNIJ    Inject 1.7 mg into the skin every 7 days.    SPIRONOLACTONE (ALDACTONE) 25 MG TABLET    Take 25 mg by mouth.    TRAZODONE (DESYREL) 50 MG TABLET    Take 50 mg by mouth every evening.    XOFLUZA 80 MG TABLET    Take 1 tablet by mouth once.   Discontinued Medications    BUPROPION (WELLBUTRIN XL) 150 MG TB24 TABLET    Take 150 mg by mouth every morning.    DIAZEPAM (VALIUM) 10 MG TAB    Take 1 tablet (10 mg total) by mouth 1 (one) time if needed (Take once 30 minutes prior to procedure. May repeat once if needed).    TRAMADOL-ACETAMINOPHEN 37.5-325 MG (ULTRACET) 37.5-325 MG TAB    Take 1 tablet  by mouth every 6 (six) hours as needed.         Follow up if symptoms worsen or fail to improve, for Wellness already scheduled. In addition to their scheduled follow up, the patient has also been instructed to follow up on as needed basis.   Future Appointments   Date Time Provider Department Center   7/17/2025  8:30 AM Nicholas Rivas FNP [facility]       HONG Mathew                [1]   Social History  Tobacco Use    Smoking status: Every Day     Types: Vaping w/o nicotine    Smokeless tobacco: Never    Tobacco comments:     Vapes Marijuana   Substance Use Topics    Alcohol use: Yes     Comment: socially    Drug use: Yes     Types: Marijuana     Comment: prescription marijuana

## 2025-03-19 NOTE — PATIENT INSTRUCTIONS
Linwood Roberson,     If you are due for any health screening(s) below please notify me so we can arrange them to be ordered and scheduled. Most healthy patients at your age complete them, but you are free to accept or refuse.     If you can't do it, I'll definitely understand. If you can, I'd certainly appreciate it!    Tests to Keep You Healthy    Cervical Cancer Screening: Met on 8/29/2023  Last Blood Pressure <= 139/89 (3/19/2025): Yes  Tobacco Cessation: NO      Were here to help you quit smoking     Our records indicated that you are still smoking. One of the best things you can do for your health is to stop smoking and we are here to help.     Talk with your provider about our Smoking Cessation Program and how we can support you on your journey.

## 2025-04-07 ENCOUNTER — TELEPHONE (OUTPATIENT)
Facility: CLINIC | Age: 25
End: 2025-04-07
Payer: COMMERCIAL

## 2025-05-07 ENCOUNTER — LAB VISIT (OUTPATIENT)
Dept: LAB | Facility: HOSPITAL | Age: 25
End: 2025-05-07
Attending: INTERNAL MEDICINE
Payer: COMMERCIAL

## 2025-05-07 DIAGNOSIS — R06.02 SHORTNESS OF BREATH: ICD-10-CM

## 2025-05-07 DIAGNOSIS — I10 ESSENTIAL HYPERTENSION, MALIGNANT: ICD-10-CM

## 2025-05-07 DIAGNOSIS — R06.09 DYSPNEA ON EXERTION: Primary | ICD-10-CM

## 2025-05-07 LAB
ERYTHROCYTE [DISTWIDTH] IN BLOOD BY AUTOMATED COUNT: 12.6 % (ref 11.5–17)
HCT VFR BLD AUTO: 43.3 % (ref 37–47)
HGB BLD-MCNC: 14.1 G/DL (ref 12–16)
MCH RBC QN AUTO: 27.2 PG (ref 27–31)
MCHC RBC AUTO-ENTMCNC: 32.6 G/DL (ref 33–36)
MCV RBC AUTO: 83.6 FL (ref 80–94)
PLATELET # BLD AUTO: 242 X10(3)/MCL (ref 130–400)
PMV BLD AUTO: 10.8 FL (ref 7.4–10.4)
RBC # BLD AUTO: 5.18 X10(6)/MCL (ref 4.2–5.4)
WBC # BLD AUTO: 7.27 X10(3)/MCL (ref 4.5–11.5)

## 2025-05-07 PROCEDURE — 85027 COMPLETE CBC AUTOMATED: CPT

## 2025-05-07 PROCEDURE — 36415 COLL VENOUS BLD VENIPUNCTURE: CPT

## 2025-05-29 ENCOUNTER — PATIENT MESSAGE (OUTPATIENT)
Dept: FAMILY MEDICINE | Facility: CLINIC | Age: 25
End: 2025-05-29
Payer: COMMERCIAL

## 2025-05-29 NOTE — TELEPHONE ENCOUNTER
Spoke with Pt, advised her she isn't due for cervical screening until next year. She is still able to book with OB to have STD Panel done. Pt verbally understood

## 2025-06-18 ENCOUNTER — TELEPHONE (OUTPATIENT)
Facility: CLINIC | Age: 25
End: 2025-06-18
Payer: COMMERCIAL

## 2025-06-18 NOTE — TELEPHONE ENCOUNTER
*Called patient and left a voicemail message.  Called alternate contact (Ladi Vera) and asked for patient to call me back.    New Patient Visit Phone Call Prior to visit    Patient is being referred to Dr. Mikael Fischer and is scheduled on 0619/2025 at 2:30 pm .    Patient Information:  Referred by: Jolanta Ly FNP   Reason for referral: Lumbar radiculopathy, Cervical radiculopathy, and Sacroiliitis  Per HONG Mathew office visit of 03/19/2025:  She also has been struggling neck tightness and pulling with intermittent shooting and shocking pain down the left arm into the left ring and long fingers.   Is this accident or work related injury? __  Is there an  involved? __       Medical and Surgical History:  PMH:  has a past medical history of Depression,  Headache due to hypertension, Heart disease, migraine headaches, Hypertension, Insulin resistance, and PTSD (post-traumatic stress disorder).   PSH:  has no pertinent surgical history   Allergies:Review of patient's allergies indicates:  No Known Allergies      Previous Treatments:    Non-Medication Measures in last 1 year  In last one year, patient   Physician directed PT/OT/Chiropractic for current complaint: Patient has previously completed physical therapy for current presentation in year ___ to a total of ___ sessions at location ___. Patient found ____ improvement for pain and ___ improvement in functionality for current presentation.     Medications  Current Pain Medications: NSAIDs: Ibuprofen (Motrin) and Local anesthetics: Lidoderm patch or gel  Antiplatelets/Anticoagulants: not on any antiplatelet agents and not on any anticoagulants  PCP: HONG Wagner  Cardiologist: Not pertinent    Interventional Procedures  Injections with other providers: __  Surgery of the area of pain: __  Other: __    What do you think helps most with pain in the 6 months to 1 year: __    *Please note:  There is a telephone note dated 02/26/2025 that  "states:  "Pt contacted office stating she was told when she was scheduled that Alban Chapman NP doesn't prescribe pain medication and was asking who does. I advised pt that neither Dr Landaverde or Ms live prescribes Narcotics, pt said thank you & hung up ."    Communications  Check-in time at  30 minutes prior to the visit time or may delay initial evaluation  Visit will include pain assessment, physical examination (so please come in easy clothing, shoes and socks that can easily come off), and treatment plans.   If patient has answered yes to pain medications I.e. opioids for therapies that help with pain above, please communicate that Dr. Fischer, doesn't offer pain medications like oxycodone typically unless cancer pain.  "

## 2025-06-26 ENCOUNTER — PATIENT MESSAGE (OUTPATIENT)
Dept: FAMILY MEDICINE | Facility: CLINIC | Age: 25
End: 2025-06-26
Payer: COMMERCIAL

## 2025-06-26 DIAGNOSIS — R30.0 DYSURIA: Primary | ICD-10-CM

## 2025-06-27 RX ORDER — NITROFURANTOIN 25; 75 MG/1; MG/1
100 CAPSULE ORAL 2 TIMES DAILY
Qty: 14 CAPSULE | Refills: 0 | Status: SHIPPED | OUTPATIENT
Start: 2025-06-27 | End: 2025-07-04

## 2025-06-30 ENCOUNTER — LAB VISIT (OUTPATIENT)
Dept: LAB | Facility: HOSPITAL | Age: 25
End: 2025-06-30
Attending: NURSE PRACTITIONER
Payer: COMMERCIAL

## 2025-06-30 ENCOUNTER — PATIENT OUTREACH (OUTPATIENT)
Facility: CLINIC | Age: 25
End: 2025-06-30
Payer: COMMERCIAL

## 2025-06-30 DIAGNOSIS — Z00.00 WELLNESS EXAMINATION: ICD-10-CM

## 2025-06-30 LAB
25(OH)D3+25(OH)D2 SERPL-MCNC: 22 NG/ML (ref 30–80)
ALBUMIN SERPL-MCNC: 3.7 G/DL (ref 3.5–5)
ALBUMIN/GLOB SERPL: 0.8 RATIO (ref 1.1–2)
ALP SERPL-CCNC: 94 UNIT/L (ref 40–150)
ALT SERPL-CCNC: 18 UNIT/L (ref 0–55)
ANION GAP SERPL CALC-SCNC: 6 MEQ/L
AST SERPL-CCNC: 19 UNIT/L (ref 11–45)
BASOPHILS # BLD AUTO: 0.03 X10(3)/MCL
BASOPHILS NFR BLD AUTO: 0.4 %
BILIRUB SERPL-MCNC: 0.9 MG/DL
BUN SERPL-MCNC: 7.9 MG/DL (ref 7–18.7)
CALCIUM SERPL-MCNC: 10 MG/DL (ref 8.4–10.2)
CHLORIDE SERPL-SCNC: 105 MMOL/L (ref 98–107)
CHOLEST SERPL-MCNC: 251 MG/DL
CHOLEST/HDLC SERPL: 5 {RATIO} (ref 0–5)
CO2 SERPL-SCNC: 27 MMOL/L (ref 22–29)
CREAT SERPL-MCNC: 0.78 MG/DL (ref 0.55–1.02)
CREAT/UREA NIT SERPL: 10
EOSINOPHIL # BLD AUTO: 0.09 X10(3)/MCL (ref 0–0.9)
EOSINOPHIL NFR BLD AUTO: 1.1 %
ERYTHROCYTE [DISTWIDTH] IN BLOOD BY AUTOMATED COUNT: 13 % (ref 11.5–17)
EST. AVERAGE GLUCOSE BLD GHB EST-MCNC: 102.5 MG/DL
GFR SERPLBLD CREATININE-BSD FMLA CKD-EPI: >60 ML/MIN/1.73/M2
GLOBULIN SER-MCNC: 4.5 GM/DL (ref 2.4–3.5)
GLUCOSE SERPL-MCNC: 134 MG/DL (ref 74–100)
HBA1C MFR BLD: 5.2 %
HCT VFR BLD AUTO: 42.3 % (ref 37–47)
HDLC SERPL-MCNC: 49 MG/DL (ref 35–60)
HGB BLD-MCNC: 13.9 G/DL (ref 12–16)
IMM GRANULOCYTES # BLD AUTO: 0.04 X10(3)/MCL (ref 0–0.04)
IMM GRANULOCYTES NFR BLD AUTO: 0.5 %
LDLC SERPL CALC-MCNC: 175 MG/DL (ref 50–140)
LYMPHOCYTES # BLD AUTO: 1.98 X10(3)/MCL (ref 0.6–4.6)
LYMPHOCYTES NFR BLD AUTO: 24.1 %
MCH RBC QN AUTO: 27.7 PG (ref 27–31)
MCHC RBC AUTO-ENTMCNC: 32.9 G/DL (ref 33–36)
MCV RBC AUTO: 84.4 FL (ref 80–94)
MONOCYTES # BLD AUTO: 0.44 X10(3)/MCL (ref 0.1–1.3)
MONOCYTES NFR BLD AUTO: 5.3 %
NEUTROPHILS # BLD AUTO: 5.65 X10(3)/MCL (ref 2.1–9.2)
NEUTROPHILS NFR BLD AUTO: 68.6 %
PLATELET # BLD AUTO: 245 X10(3)/MCL (ref 130–400)
PMV BLD AUTO: 11 FL (ref 7.4–10.4)
POTASSIUM SERPL-SCNC: 3.7 MMOL/L (ref 3.5–5.1)
PROT SERPL-MCNC: 8.2 GM/DL (ref 6.4–8.3)
RBC # BLD AUTO: 5.01 X10(6)/MCL (ref 4.2–5.4)
SODIUM SERPL-SCNC: 138 MMOL/L (ref 136–145)
TRIGL SERPL-MCNC: 135 MG/DL (ref 37–140)
TSH SERPL-ACNC: 1.21 UIU/ML (ref 0.35–4.94)
VLDLC SERPL CALC-MCNC: 27 MG/DL
WBC # BLD AUTO: 8.23 X10(3)/MCL (ref 4.5–11.5)

## 2025-06-30 PROCEDURE — 80061 LIPID PANEL: CPT

## 2025-06-30 PROCEDURE — 83036 HEMOGLOBIN GLYCOSYLATED A1C: CPT

## 2025-06-30 PROCEDURE — 36415 COLL VENOUS BLD VENIPUNCTURE: CPT

## 2025-06-30 PROCEDURE — 80053 COMPREHEN METABOLIC PANEL: CPT

## 2025-06-30 PROCEDURE — 85025 COMPLETE CBC W/AUTO DIFF WBC: CPT

## 2025-06-30 PROCEDURE — 84443 ASSAY THYROID STIM HORMONE: CPT

## 2025-06-30 PROCEDURE — 82306 VITAMIN D 25 HYDROXY: CPT

## 2025-06-30 NOTE — PROGRESS NOTES
Population Health Outreach.    Patient is not due for any topics at this time.     Portal Active-message sent encouraging to consider out Smoking Cessation Program.    Next Primary Care Visit Date: 7/17/2025

## 2025-07-01 ENCOUNTER — TELEPHONE (OUTPATIENT)
Dept: FAMILY MEDICINE | Facility: CLINIC | Age: 25
End: 2025-07-01
Payer: COMMERCIAL

## 2025-07-01 NOTE — TELEPHONE ENCOUNTER
Copied from CRM #[id number]. Topic: Appointments - Hospital Follow Up  >> Jun 30, 2025  1:50 PM Kari wrote:  .Who Called:  st trudy Hare    What order is the patient requesting:    When does the expect the orders to be performed?         Preferred Method of Contact: Phone Call  Patient's Preferred Phone Number on File: [phone]  Best Call Back Number, if different:  Additional Information: Clarification on orders

## 2025-07-02 ENCOUNTER — RESULTS FOLLOW-UP (OUTPATIENT)
Dept: FAMILY MEDICINE | Facility: CLINIC | Age: 25
End: 2025-07-02

## 2025-07-02 ENCOUNTER — TELEPHONE (OUTPATIENT)
Dept: FAMILY MEDICINE | Facility: CLINIC | Age: 25
End: 2025-07-02
Payer: COMMERCIAL

## 2025-07-02 NOTE — TELEPHONE ENCOUNTER
Copied from CRM #[id number]. Topic: Medications - Medication Question  >> Jul 2, 2025  1:22 PM Monet wrote:  .Who Called: Karol Flanagan    Caller is requesting assistance/information from provider's office.    Symptoms (please be specific): N/A   How long has patient had these symptoms:  N/A  List of preferred pharmacies on file (remove unneeded): [unfilled]  If different, enter pharmacy into here including location and phone number: N/A      Preferred Method of Contact: Phone Call    Patient's Preferred Phone Number on File: [phone]     Best Call Back Number, if different:    Additional Information: Pt has questions about Lab results and pt requesting antibiotics sent to [id number] - [place], [place] - [address] AT [facility] [address]. Please advise, thank you

## 2025-07-16 DIAGNOSIS — E66.811 OBESITY (BMI 30.0-34.9): ICD-10-CM

## 2025-07-16 DIAGNOSIS — E28.2 PCOS (POLYCYSTIC OVARIAN SYNDROME): ICD-10-CM

## 2025-07-16 RX ORDER — SEMAGLUTIDE 1.7 MG/.75ML
1.7 INJECTION, SOLUTION SUBCUTANEOUS
Qty: 0.75 ML | Refills: 4 | Status: SHIPPED | OUTPATIENT
Start: 2025-07-16 | End: 2025-07-18

## 2025-07-17 ENCOUNTER — OFFICE VISIT (OUTPATIENT)
Dept: FAMILY MEDICINE | Facility: CLINIC | Age: 25
End: 2025-07-17
Payer: COMMERCIAL

## 2025-07-17 VITALS
DIASTOLIC BLOOD PRESSURE: 80 MMHG | HEIGHT: 64 IN | WEIGHT: 201.63 LBS | BODY MASS INDEX: 34.42 KG/M2 | TEMPERATURE: 98 F | RESPIRATION RATE: 18 BRPM | HEART RATE: 80 BPM | OXYGEN SATURATION: 98 % | SYSTOLIC BLOOD PRESSURE: 117 MMHG

## 2025-07-17 DIAGNOSIS — E28.2 PCOS (POLYCYSTIC OVARIAN SYNDROME): ICD-10-CM

## 2025-07-17 DIAGNOSIS — Z00.00 WELLNESS EXAMINATION: ICD-10-CM

## 2025-07-17 DIAGNOSIS — F43.10 PTSD (POST-TRAUMATIC STRESS DISORDER): Primary | ICD-10-CM

## 2025-07-17 DIAGNOSIS — E66.811 OBESITY (BMI 30.0-34.9): ICD-10-CM

## 2025-07-17 RX ORDER — BREXPIPRAZOLE 0.5 MG/1
1 TABLET ORAL DAILY
Qty: 30 TABLET | Refills: 1 | Status: SHIPPED | OUTPATIENT
Start: 2025-07-17

## 2025-07-17 RX ORDER — PROPRANOLOL HYDROCHLORIDE 10 MG/1
10 TABLET ORAL 2 TIMES DAILY
COMMUNITY
Start: 2025-06-09

## 2025-07-17 RX ORDER — BREXPIPRAZOLE 0.5 MG/1
1 TABLET ORAL DAILY
COMMUNITY
Start: 2025-05-09 | End: 2025-07-17 | Stop reason: SDUPTHER

## 2025-07-18 RX ORDER — TIRZEPATIDE 2.5 MG/.5ML
2.5 INJECTION, SOLUTION SUBCUTANEOUS
Qty: 2 ML | Refills: 0 | Status: SHIPPED | OUTPATIENT
Start: 2025-07-18

## 2025-07-18 NOTE — ASSESSMENT & PLAN NOTE
Rexulti recommend patient follow up with mental health provider.  Denies SI, HI hallucinations.   Establish good family/social support, reading, meditation, physical activity exercise.  Avoid/limit caffeine, alcohol and stimulants.  Practice positive phrases  and relaxation techniques.  Set healthy boundaries, avoid people and conversations that increase stress.  ED precautions discussed      Orders:    REXULTI 0.5 mg Tab; Take 1 tablet (0.5 mg total) by mouth Daily.

## 2025-07-18 NOTE — PROGRESS NOTES
SUBJECTIVE:     History of Present Illness      Chief Complaint: Annual Exam    HPI:  Patient is a 24 y.o. year old female who presents to clinic for annual wellness and lab review.  Patient has hypertension, PTSD, PCOS.  She was recently seen by gynecology started on metformin for PCOS.  She reports that she has a appointment with her cardiologist in her mental health provider.  She reports being out of her Rexulti needing a refill.  Denies SI, HI or hallucinations.    Review of Systems:    Review of Systems    12 point review of systems conducted, negative except as stated in the history of present illness. See HPI for details.     Previous History      PCP: Nicholas Rivas FNP  Review of patient's allergies indicates:  No Known Allergies    Past Medical History:   Diagnosis Date    Depression     Genetic testing     MyRisk Negative, Remaining Lifitime Risk 11.6%    GERD (gastroesophageal reflux disease)     Headache due to hypertension     Heart disease     Hx of migraine headaches     Hypertension     Insulin resistance     PCOS (polycystic ovarian syndrome)     PTSD (post-traumatic stress disorder)        Past Surgical History:   Procedure Laterality Date     SECTION      WISDOM TOOTH EXTRACTION       Family History   Problem Relation Name Age of Onset    Thyroid disease Mother      Breast cancer Maternal Grandmother      Left Breast Cancer Maternal Grandmother      Diabetes Maternal Grandfather         Social History[1]     Health Maintenance      Health Maintenance   Topic Date Due    Hepatitis C Screening  Never done    TETANUS VACCINE  2022    COVID-19 Vaccine ( - - season) Never done    Influenza Vaccine (1) 2025    Pap Smear  2026    RSV Vaccine (Age 60+ and Pregnant patients) (1 - 1-dose 75+ series) 2076    HIV Screening  Completed    Lipid Panel  Completed    HPV Vaccines  Completed    Pneumococcal Vaccines (Age 0-49)  Aged Out       OBJECTIVE:     Physical Exam   "    Vital Signs Reviewed   Visit Vitals  /80 (BP Location: Left arm, Patient Position: Sitting)   Pulse 80   Temp 98 °F (36.7 °C) (Oral)   Resp 18   Ht 5' 4" (1.626 m)   Wt 91.4 kg (201 lb 9.6 oz)   LMP 06/11/2025 (Exact Date)   SpO2 98%   BMI 34.60 kg/m²       Physical Exam    Physical Exam:  General: Alert, well nourished, no acute distress, non-toxic appearing.   Eyes: Anicteric sclera, without conjunctival injection, normal lids, no purulent drainage, EOMs grossly intact.   Ears: No tragal tenderness. Tympanic membranes intact, pearly grey, without effusion or erythema and with a positive light reflex.   Mouth: Posterior pharynx without erythema. No exudate, ulcerations, or lesion. No tonsillar swelling.   Neck: Supple, full ROM, no rigidity, no cervical adenopathy.   Cardio: Normal rate and rhythm    Resp: Respirations even and unlabored, clear to auscultation bilaterally.   Abd: No ecchymosis or distension. Normal bowel sounds in all 4 quadrants. No tenderness to palpation. No rebound tenderness or guarding. No CVA tenderness.   Skin: No rashes or open lesions noted.   MSK: No swelling. No abrasions or signs of trauma. Ambulating without assistance.   Neuro: Alert,oriented No focal deficits noted. Facial expressions even.   Psych: Cooperative, Normal affect      Labs   Chemistry:  Lab Results   Component Value Date     06/30/2025    K 3.7 06/30/2025    BUN 7.9 06/30/2025    CREATININE 0.78 06/30/2025    EGFRNORACEVR >60 06/30/2025    CALCIUM 10.0 06/30/2025    ALKPHOS 94 06/30/2025    ALBUMIN 3.7 06/30/2025    AST 19 06/30/2025    ALT 18 06/30/2025    EVDYLUYD12LC 22 (L) 06/30/2025    TSH 1.206 06/30/2025        Lab Results   Component Value Date    HGBA1C 5.2 06/30/2025        Hematology:  Lab Results   Component Value Date    WBC 8.23 06/30/2025    HGB 13.9 06/30/2025    HCT 42.3 06/30/2025     06/30/2025       Lipid Panel:  Lab Results   Component Value Date    CHOL 251 (H) 06/30/2025    " HDL 49 06/30/2025    .00 (H) 06/30/2025    TRIG 135 06/30/2025    TOTALCHOLEST 5 06/30/2025        Urine:  Lab Results   Component Value Date    APPEARANCEUA Clear 06/30/2025    SGUA 1.020 06/30/2025    PROTEINUA Trace (A) 06/30/2025    KETONESUA Negative 06/30/2025    LEUKOCYTESUR Negative 06/30/2025    RBCUA 0-5 06/30/2025    WBCUA None Seen 06/30/2025    BACTERIA Few (A) 06/30/2025         Assessment         ICD-10-CM ICD-9-CM   1. PTSD (post-traumatic stress disorder)  F43.10 309.81   2. Wellness examination  Z00.00 V70.0   3. PCOS (polycystic ovarian syndrome)  E28.2 256.4   4. Obesity (BMI 30.0-34.9)  E66.811 278.00       Plan       Assessment & Plan  Wellness examination  Discussed labs and preventative screenings   Overall health status reviewed.    Significant chronic conditions addressed, including ongoing treatment plans.   Good health habits reinforced.    Cardiovascular disease risk factors discussed.   Appropriate recommendations and preventative care medical   information provided with annual wellness exams encouraged.  Vaccination status     Cervical - UTD          PTSD (post-traumatic stress disorder)  Rexulti recommend patient follow up with mental health provider.  Denies SI, HI hallucinations.   Establish good family/social support, reading, meditation, physical activity exercise.  Avoid/limit caffeine, alcohol and stimulants.  Practice positive phrases  and relaxation techniques.  Set healthy boundaries, avoid people and conversations that increase stress.  ED precautions discussed      Orders:    REXULTI 0.5 mg Tab; Take 1 tablet (0.5 mg total) by mouth Daily.    PCOS (polycystic ovarian syndrome)  Metformin per gyn         Obesity (BMI 30.0-34.9)  Take time to exercise, If you  do not have time/ or can't tolerate  30 minute workouts daily ,  Try three -10 minute exercises each day instead.    Choose healthy snacks, plan healthy meals, take care when eating out, learn not to over eat  .  Plan a head,  keep a food diary, add fiber to your meals, do not go to the grocery store hungry.    Make/prepare a  grocery list before shopping  so you only buy what you need.  pack snacks and  avoid snacks at the nearest vending machine.    If you feel thirsty, drink water.  Water has no calories and is a great thirst quencher.  Keep in mind low calorie foods can add up.  Just because you choose  low-calorie foods does not mean you do not have to count the calories you eat.      Orders:    tirzepatide, weight loss, (ZEPBOUND) 2.5 mg/0.5 mL PnIj; Inject 2.5 mg into the skin every 7 days.       Orders Placed This Encounter    REXULTI 0.5 mg Tab    tirzepatide, weight loss, (ZEPBOUND) 2.5 mg/0.5 mL PnIj      Medication List with Changes/Refills   New Medications    TIRZEPATIDE, WEIGHT LOSS, (ZEPBOUND) 2.5 MG/0.5 ML PNIJ    Inject 2.5 mg into the skin every 7 days.   Current Medications    AMLODIPINE (NORVASC) 5 MG TABLET    Take 5 mg by mouth 2 (two) times daily.    AZELASTINE (ASTELIN) 137 MCG (0.1 %) NASAL SPRAY    1 spray (137 mcg total) by Nasal route 2 (two) times daily.    ETONOGESTREL (NEXPLANON SDRM)    by Subdermal route.    IBUPROFEN (ADVIL,MOTRIN) 600 MG TABLET    Take 600 mg by mouth daily as needed.    LIDOCAINE (LIDODERM) 5 %    Place 1 patch onto the skin once daily.    METFORMIN (GLUCOPHAGE-XR) 500 MG ER 24HR TABLET    Take 1 tablet (500 mg total) by mouth daily with dinner or evening meal.    PRAZOSIN (MINIPRESS) 1 MG CAP    Take 1 mg by mouth.    PROPRANOLOL (INDERAL) 10 MG TABLET    Take 10 mg by mouth 2 (two) times daily.    PYRILAMINE-CHLOPHEDIANOL 12.5-12.5 MG/5 ML LIQD    Take 10 mLs by mouth 3 (three) times daily.    XOFLUZA 80 MG TABLET    Take 1 tablet by mouth once.   Changed and/or Refilled Medications    Modified Medication Previous Medication    REXULTI 0.5 MG TAB REXULTI 0.5 mg Tab       Take 1 tablet (0.5 mg total) by mouth Daily.    Take 1 tablet by mouth Daily.   Discontinued  Medications    ARIPIPRAZOLE (ABILIFY) 5 MG TAB    TAKE 1 TABLET BY MOUTH DAILY IN THE MORNING    LABETALOL (NORMODYNE) 200 MG TABLET    Take 1 tablet by mouth.    LOSARTAN-HYDROCHLOROTHIAZIDE 50-12.5 MG (HYZAAR) 50-12.5 MG PER TABLET        NORGESTIMATE-ETHINYL ESTRADIOL (ORTHO-CYCLEN) 0.25-35 MG-MCG PER TABLET    Take 1 tablet by mouth once daily.    SEMAGLUTIDE, WEIGHT LOSS, (WEGOVY) 1.7 MG/0.75 ML PNIJ    Inject 1.7 mg into the skin every 7 days.    SPIRONOLACTONE (ALDACTONE) 25 MG TABLET    Take 25 mg by mouth.    TRAZODONE (DESYREL) 50 MG TABLET    Take 50 mg by mouth every evening.         Follow up in about 3 months (around 10/17/2025), or if symptoms worsen or fail to improve. In addition to their scheduled follow up, the patient has also been instructed to follow up on as needed basis.   Future Appointments   Date Time Provider Department Center   10/20/2025 10:30 AM Nicholas Rivas FNP [facility]   7/23/2026  9:30 AM Nicholas Rivas FNP [facility]       HONG Mathew                [1]   Social History  Tobacco Use    Smoking status: Former     Types: Vaping w/o nicotine    Smokeless tobacco: Never    Tobacco comments:     Vapes Marijuana   Substance Use Topics    Alcohol use: Yes     Comment: socially    Drug use: Yes     Frequency: 4.0 times per week     Types: Marijuana     Comment: prescription marijuana

## 2025-07-28 ENCOUNTER — PATIENT MESSAGE (OUTPATIENT)
Dept: FAMILY MEDICINE | Facility: CLINIC | Age: 25
End: 2025-07-28
Payer: COMMERCIAL